# Patient Record
Sex: FEMALE | Race: WHITE | Employment: UNEMPLOYED | URBAN - METROPOLITAN AREA
[De-identification: names, ages, dates, MRNs, and addresses within clinical notes are randomized per-mention and may not be internally consistent; named-entity substitution may affect disease eponyms.]

---
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
## 2018-12-07 ENCOUNTER — HOSPITAL ENCOUNTER (EMERGENCY)
Age: 18
Discharge: HOME | End: 2018-12-07
Payer: MEDICAID

## 2018-12-07 VITALS
HEIGHT: 64 IN | WEIGHT: 197 LBS | DIASTOLIC BLOOD PRESSURE: 70 MMHG | OXYGEN SATURATION: 99 % | TEMPERATURE: 98.8 F | HEART RATE: 96 BPM | BODY MASS INDEX: 33.63 KG/M2 | RESPIRATION RATE: 16 BRPM | SYSTOLIC BLOOD PRESSURE: 132 MMHG

## 2018-12-07 DIAGNOSIS — L03.011 PARONYCHIA OF FINGER OF RIGHT HAND: Primary | ICD-10-CM

## 2018-12-07 PROCEDURE — 10060 I&D ABSCESS SIMPLE/SINGLE: CPT

## 2018-12-07 PROCEDURE — 6370000000 HC RX 637 (ALT 250 FOR IP): Performed by: NURSE PRACTITIONER

## 2018-12-07 PROCEDURE — 99283 EMERGENCY DEPT VISIT LOW MDM: CPT

## 2018-12-07 RX ORDER — SULFAMETHOXAZOLE AND TRIMETHOPRIM 800; 160 MG/1; MG/1
1 TABLET ORAL 2 TIMES DAILY
Qty: 20 TABLET | Refills: 0 | Status: SHIPPED | OUTPATIENT
Start: 2018-12-07 | End: 2018-12-17

## 2018-12-07 RX ORDER — OXYCODONE HYDROCHLORIDE AND ACETAMINOPHEN 5; 325 MG/1; MG/1
1 TABLET ORAL EVERY 6 HOURS PRN
Qty: 10 TABLET | Refills: 0 | Status: SHIPPED | OUTPATIENT
Start: 2018-12-07 | End: 2018-12-10

## 2018-12-07 RX ORDER — OXYCODONE HYDROCHLORIDE AND ACETAMINOPHEN 5; 325 MG/1; MG/1
1 TABLET ORAL ONCE
Status: COMPLETED | OUTPATIENT
Start: 2018-12-07 | End: 2018-12-07

## 2018-12-07 RX ADMIN — OXYCODONE AND ACETAMINOPHEN 1 TABLET: 5; 325 TABLET ORAL at 14:45

## 2018-12-07 ASSESSMENT — PAIN DESCRIPTION - LOCATION: LOCATION: HAND

## 2018-12-07 ASSESSMENT — PAIN SCALES - GENERAL
PAINLEVEL_OUTOF10: 10
PAINLEVEL_OUTOF10: 9

## 2018-12-07 ASSESSMENT — PAIN DESCRIPTION - PAIN TYPE: TYPE: ACUTE PAIN

## 2018-12-07 ASSESSMENT — PAIN DESCRIPTION - ORIENTATION: ORIENTATION: RIGHT

## 2018-12-07 ASSESSMENT — PAIN DESCRIPTION - DESCRIPTORS: DESCRIPTORS: BURNING;DISCOMFORT;THROBBING

## 2019-03-23 ENCOUNTER — HOSPITAL ENCOUNTER (EMERGENCY)
Age: 19
Discharge: HOME | End: 2019-03-23
Attending: EMERGENCY MEDICINE
Payer: MEDICAID

## 2019-03-23 ENCOUNTER — APPOINTMENT (OUTPATIENT)
Dept: GENERAL RADIOLOGY | Age: 19
End: 2019-03-23
Payer: MEDICAID

## 2019-03-23 VITALS
TEMPERATURE: 98 F | RESPIRATION RATE: 18 BRPM | HEART RATE: 95 BPM | DIASTOLIC BLOOD PRESSURE: 75 MMHG | SYSTOLIC BLOOD PRESSURE: 118 MMHG | OXYGEN SATURATION: 98 %

## 2019-03-23 DIAGNOSIS — F19.10 POLYSUBSTANCE ABUSE (HCC): Primary | ICD-10-CM

## 2019-03-23 PROCEDURE — 99284 EMERGENCY DEPT VISIT MOD MDM: CPT

## 2019-03-23 PROCEDURE — 71046 X-RAY EXAM CHEST 2 VIEWS: CPT

## 2019-03-23 PROCEDURE — 6370000000 HC RX 637 (ALT 250 FOR IP): Performed by: EMERGENCY MEDICINE

## 2019-03-23 RX ORDER — IBUPROFEN 400 MG/1
600 TABLET ORAL ONCE
Status: COMPLETED | OUTPATIENT
Start: 2019-03-23 | End: 2019-03-23

## 2019-03-23 RX ADMIN — IBUPROFEN 600 MG: 400 TABLET ORAL at 12:18

## 2019-03-23 ASSESSMENT — PAIN SCALES - GENERAL: PAINLEVEL_OUTOF10: 6

## 2019-06-27 ENCOUNTER — HOSPITAL ENCOUNTER (OUTPATIENT)
Age: 19
Discharge: HOME | End: 2019-06-29
Payer: COMMERCIAL

## 2019-06-27 PROCEDURE — 87491 CHLMYD TRACH DNA AMP PROBE: CPT

## 2019-06-27 PROCEDURE — 87591 N.GONORRHOEAE DNA AMP PROB: CPT

## 2019-07-01 LAB
C. TRACHOMATIS DNA ,URINE: NEGATIVE
N. GONORRHOEAE DNA, URINE: NEGATIVE
SOURCE: NORMAL

## 2020-04-10 ENCOUNTER — HOSPITAL ENCOUNTER (EMERGENCY)
Age: 20
Discharge: HOME | End: 2020-04-10
Payer: COMMERCIAL

## 2020-04-10 VITALS
BODY MASS INDEX: 36.7 KG/M2 | OXYGEN SATURATION: 99 % | WEIGHT: 215 LBS | SYSTOLIC BLOOD PRESSURE: 135 MMHG | HEIGHT: 64 IN | DIASTOLIC BLOOD PRESSURE: 70 MMHG | RESPIRATION RATE: 16 BRPM | TEMPERATURE: 98.1 F | HEART RATE: 86 BPM

## 2020-04-10 PROCEDURE — 99283 EMERGENCY DEPT VISIT LOW MDM: CPT

## 2020-04-10 RX ORDER — BUPROPION HYDROCHLORIDE 300 MG/1
300 TABLET ORAL EVERY MORNING
Status: ON HOLD | COMMUNITY
End: 2021-09-27 | Stop reason: ALTCHOICE

## 2020-04-10 RX ORDER — CLONIDINE HYDROCHLORIDE 0.1 MG/1
0.1 TABLET ORAL 2 TIMES DAILY
Status: ON HOLD | COMMUNITY
End: 2021-09-27 | Stop reason: ALTCHOICE

## 2020-04-10 RX ORDER — BUPROPION HYDROCHLORIDE 150 MG/1
150 TABLET, EXTENDED RELEASE ORAL 2 TIMES DAILY
Status: ON HOLD | COMMUNITY
End: 2021-09-27 | Stop reason: ALTCHOICE

## 2020-04-10 RX ORDER — OXCARBAZEPINE 300 MG/1
300 TABLET, FILM COATED ORAL 2 TIMES DAILY
Status: ON HOLD | COMMUNITY
End: 2021-09-27 | Stop reason: ALTCHOICE

## 2020-04-10 ASSESSMENT — PAIN DESCRIPTION - ORIENTATION: ORIENTATION: LEFT;LOWER

## 2020-04-10 ASSESSMENT — PAIN DESCRIPTION - LOCATION: LOCATION: MOUTH

## 2020-04-11 NOTE — ED PROVIDER NOTES
found for this visit on 04/10/20. RADIOLOGY:  Interpreted by Radiologist.  No orders to display       ------------------------- NURSING NOTES AND VITALS REVIEWED ---------------------------   The nursing notes within the ED encounter and vital signs as below have been reviewed. /70   Pulse 86   Temp 98.1 °F (36.7 °C)   Resp 16   Ht 5' 4\" (1.626 m)   Wt 215 lb (97.5 kg)   SpO2 99%   BMI 36.90 kg/m²   Oxygen Saturation Interpretation: Normal      ---------------------------------------------------PHYSICAL EXAM--------------------------------------      Constitutional/General: Alert and oriented x3, well appearing, non toxic in NAD  Head: Normocephalic and atraumatic  Eyes: PERRL, EOMI  Mouth: Oropharynx clear, handling secretions, no trismus, piercing to left lower lip present with mucosal skin growing over top of the piercing. Upon removal only minimal bleeding and puncture to the left lower lip. Neck: Supple, full ROM,   Pulmonary: Lungs clear to auscultation bilaterally, no wheezes, rales, or rhonchi. Not in respiratory distress  Cardiovascular:  Regular rate and rhythm, no murmurs, gallops, or rubs. 2+ distal pulses  Abdomen: Soft, non tender, non distended,   Extremities: Moves all extremities x 4. Warm and well perfused  Skin: warm and dry without rash  Neurologic: GCS 15,  Psych: Normal Affect      ------------------------------ ED COURSE/MEDICAL DECISION MAKING----------------------  Medications - No data to display      ED COURSE:       Medical Decision Making:    Patient is a 64-year-old female presenting emergency department is unable to remove a lip ring. I was initially unable to retrieve the inner portion of the ring therefore her lip was numbed with 1% lidocaine without epinephrine. Gentle force was applied to retrieve the inner portion of the ring and hemostats were used to clamp the ring and spin to remove it. Patient tolerated the procedure well with no complications.

## 2020-09-03 ENCOUNTER — TELEPHONE (OUTPATIENT)
Dept: OBGYN | Age: 20
End: 2020-09-03

## 2020-09-03 NOTE — TELEPHONE ENCOUNTER
Pt phoned in with c/o white discharge, itching and burning. Believes she has a yeast infection. States she is approx 7 weeks pregnant and has initial prenatal visit scheduled on 9/17. Per you, I notified pt to take OTC Monistat and advised her to call if still having symptoms after treatment. She verbalized understanding.

## 2020-09-14 ENCOUNTER — APPOINTMENT (OUTPATIENT)
Dept: ULTRASOUND IMAGING | Age: 20
End: 2020-09-14
Payer: COMMERCIAL

## 2020-09-14 ENCOUNTER — HOSPITAL ENCOUNTER (EMERGENCY)
Age: 20
Discharge: HOME | End: 2020-09-15
Attending: EMERGENCY MEDICINE
Payer: COMMERCIAL

## 2020-09-14 LAB
ABO/RH: NORMAL
ALBUMIN SERPL-MCNC: 4 G/DL (ref 3.5–5.2)
ALP BLD-CCNC: 59 U/L (ref 35–104)
ALT SERPL-CCNC: 14 U/L (ref 0–32)
ANION GAP SERPL CALCULATED.3IONS-SCNC: 10 MMOL/L (ref 7–16)
AST SERPL-CCNC: 18 U/L (ref 0–31)
BACTERIA: ABNORMAL /HPF
BASOPHILS ABSOLUTE: 0.05 E9/L (ref 0–0.2)
BASOPHILS RELATIVE PERCENT: 0.6 % (ref 0–2)
BILIRUB SERPL-MCNC: 0.5 MG/DL (ref 0–1.2)
BILIRUBIN URINE: NEGATIVE
BLOOD, URINE: ABNORMAL
BUN BLDV-MCNC: 20 MG/DL (ref 6–20)
CALCIUM SERPL-MCNC: 8.9 MG/DL (ref 8.6–10.2)
CHLORIDE BLD-SCNC: 103 MMOL/L (ref 98–107)
CLARITY: ABNORMAL
CO2: 25 MMOL/L (ref 22–29)
COLOR: ABNORMAL
CREAT SERPL-MCNC: 0.6 MG/DL (ref 0.5–1)
EOSINOPHILS ABSOLUTE: 0.2 E9/L (ref 0.05–0.5)
EOSINOPHILS RELATIVE PERCENT: 2.3 % (ref 0–6)
EPITHELIAL CELLS, UA: ABNORMAL /HPF
GFR AFRICAN AMERICAN: >60
GFR NON-AFRICAN AMERICAN: >60 ML/MIN/1.73
GLUCOSE BLD-MCNC: 96 MG/DL (ref 74–99)
GLUCOSE URINE: NEGATIVE MG/DL
GONADOTROPIN, CHORIONIC (HCG) QUANT: ABNORMAL MIU/ML
HCG, URINE, POC: POSITIVE
HCT VFR BLD CALC: 37.6 % (ref 34–48)
HEMOGLOBIN: 12.6 G/DL (ref 11.5–15.5)
IMMATURE GRANULOCYTES #: 0.01 E9/L
IMMATURE GRANULOCYTES %: 0.1 % (ref 0–5)
KETONES, URINE: NEGATIVE MG/DL
LEUKOCYTE ESTERASE, URINE: ABNORMAL
LYMPHOCYTES ABSOLUTE: 2.95 E9/L (ref 1.5–4)
LYMPHOCYTES RELATIVE PERCENT: 34.2 % (ref 20–42)
Lab: NORMAL
MCH RBC QN AUTO: 31 PG (ref 26–35)
MCHC RBC AUTO-ENTMCNC: 33.5 % (ref 32–34.5)
MCV RBC AUTO: 92.6 FL (ref 80–99.9)
MONOCYTES ABSOLUTE: 0.59 E9/L (ref 0.1–0.95)
MONOCYTES RELATIVE PERCENT: 6.8 % (ref 2–12)
NEGATIVE QC PASS/FAIL: NORMAL
NEUTROPHILS ABSOLUTE: 4.82 E9/L (ref 1.8–7.3)
NEUTROPHILS RELATIVE PERCENT: 56 % (ref 43–80)
NITRITE, URINE: NEGATIVE
PDW BLD-RTO: 11.8 FL (ref 11.5–15)
PH UA: 7 (ref 5–9)
PLATELET # BLD: 230 E9/L (ref 130–450)
PMV BLD AUTO: 10.1 FL (ref 7–12)
POSITIVE QC PASS/FAIL: NORMAL
POTASSIUM SERPL-SCNC: 3.9 MMOL/L (ref 3.5–5)
PROTEIN UA: 100 MG/DL
RBC # BLD: 4.06 E12/L (ref 3.5–5.5)
RBC UA: ABNORMAL /HPF (ref 0–2)
SODIUM BLD-SCNC: 138 MMOL/L (ref 132–146)
SPECIFIC GRAVITY UA: 1.02 (ref 1–1.03)
TOTAL PROTEIN: 6.9 G/DL (ref 6.4–8.3)
UROBILINOGEN, URINE: 2 E.U./DL
WBC # BLD: 8.6 E9/L (ref 4.5–11.5)
WBC UA: ABNORMAL /HPF (ref 0–5)

## 2020-09-14 PROCEDURE — 99284 EMERGENCY DEPT VISIT MOD MDM: CPT

## 2020-09-14 PROCEDURE — 84702 CHORIONIC GONADOTROPIN TEST: CPT

## 2020-09-14 PROCEDURE — 80053 COMPREHEN METABOLIC PANEL: CPT

## 2020-09-14 PROCEDURE — 86900 BLOOD TYPING SEROLOGIC ABO: CPT

## 2020-09-14 PROCEDURE — 85025 COMPLETE CBC W/AUTO DIFF WBC: CPT

## 2020-09-14 PROCEDURE — 76817 TRANSVAGINAL US OBSTETRIC: CPT

## 2020-09-14 PROCEDURE — 81001 URINALYSIS AUTO W/SCOPE: CPT

## 2020-09-14 PROCEDURE — 86901 BLOOD TYPING SEROLOGIC RH(D): CPT

## 2020-09-14 ASSESSMENT — ENCOUNTER SYMPTOMS
NAUSEA: 0
EYE REDNESS: 0
COUGH: 0
WHEEZING: 0
EYE DISCHARGE: 0
DIARRHEA: 0
SINUS PRESSURE: 0
SHORTNESS OF BREATH: 0
BACK PAIN: 0
ABDOMINAL DISTENTION: 0
VOMITING: 0
EYE PAIN: 0
ABDOMINAL PAIN: 1
SORE THROAT: 0

## 2020-09-14 ASSESSMENT — PAIN DESCRIPTION - LOCATION: LOCATION: ABDOMEN

## 2020-09-14 ASSESSMENT — PAIN SCALES - GENERAL: PAINLEVEL_OUTOF10: 7

## 2020-09-15 VITALS
TEMPERATURE: 98.5 F | RESPIRATION RATE: 18 BRPM | OXYGEN SATURATION: 100 % | SYSTOLIC BLOOD PRESSURE: 145 MMHG | DIASTOLIC BLOOD PRESSURE: 119 MMHG | HEART RATE: 86 BPM

## 2020-09-15 NOTE — ED PROVIDER NOTES
Patient is a 24 y/o female who presents to the ED with abdominal cramping and vaginal bleeding. Patient states that she is currently approximately 9 weeks pregnant. Tonight, she had onset of vaginal bleeding and lower abdominal cramping. She reports filling one sanitary napkin with blood. She denies passage of clots. She also has had diarrhea today. She denies any fever, nausea or vomiting. Review of Systems   Constitutional: Negative for chills and fever. HENT: Negative for ear pain, sinus pressure and sore throat. Eyes: Negative for pain, discharge and redness. Respiratory: Negative for cough, shortness of breath and wheezing. Cardiovascular: Negative for chest pain. Gastrointestinal: Positive for abdominal pain. Negative for abdominal distention, diarrhea, nausea and vomiting. Genitourinary: Positive for vaginal bleeding. Negative for dysuria and frequency. Musculoskeletal: Negative for arthralgias and back pain. Skin: Negative for rash and wound. Neurological: Negative for weakness and headaches. Hematological: Negative for adenopathy. All other systems reviewed and are negative. Physical Exam  Vitals signs and nursing note reviewed. Constitutional:       Appearance: She is obese. HENT:      Head: Normocephalic and atraumatic. Right Ear: External ear normal.      Left Ear: External ear normal.      Nose: Nose normal.      Mouth/Throat:      Mouth: Mucous membranes are moist.   Eyes:      Conjunctiva/sclera: Conjunctivae normal.      Pupils: Pupils are equal, round, and reactive to light. Neck:      Musculoskeletal: Normal range of motion and neck supple. Cardiovascular:      Rate and Rhythm: Normal rate and regular rhythm. Heart sounds: No murmur. Pulmonary:      Effort: Pulmonary effort is normal. No respiratory distress. Breath sounds: Normal breath sounds. No stridor. No wheezing, rhonchi or rales.    Abdominal:      General: Bowel sounds are normal. There is no distension. Palpations: Abdomen is soft. Tenderness: There is no abdominal tenderness. There is no guarding. Musculoskeletal: Normal range of motion. General: No swelling. Skin:     General: Skin is warm and dry. Findings: No rash. Neurological:      Mental Status: She is alert and oriented to person, place, and time. Procedures     [facility]              --------------------------------------------- PAST HISTORY ---------------------------------------------  Past Medical History:  has a past medical history of Acid reflux disease, Anemia, Anxiety, Depression, Ingrowing toenail of right foot, and Thyroid disease. Past Surgical History:  has a past surgical history that includes [place] tooth extraction (11/18/15) and nasal/sinus endoscopy. Social History:  reports that she has quit smoking. Her smoking use included cigarettes. She smoked 0.50 packs per day. She has never used smokeless tobacco. She reports previous drug use. Drugs: Methamphetamines, Marijuana, Opiates , and IV. She reports that she does not drink alcohol. Family History: family history is not on file. The patients home medications have been reviewed. Allergies: Effexor [venlafaxine hcl];  Paxil [paroxetine hcl]; and Powder    -------------------------------------------------- RESULTS -------------------------------------------------  Labs:  Results for orders placed or performed during the hospital encounter of 09/14/20   Urinalysis   Result Value Ref Range    Color, UA USMAN (A) Straw/Yellow    Clarity, UA CLOUDY (A) Clear    Glucose, Ur Negative Negative mg/dL    Bilirubin Urine Negative Negative    Ketones, Urine Negative Negative mg/dL    Specific Gravity, UA 1.025 1.005 - 1.030    Blood, Urine LARGE (A) Negative    pH, UA 7.0 5.0 - 9.0    Protein,  (A) Negative mg/dL    Urobilinogen, Urine 2.0 (A) <2.0 E.U./dL    Nitrite, Urine Negative Negative    Leukocyte Esterase, Urine TRACE (A) Negative   CBC auto differential   Result Value Ref Range    WBC 8.6 4.5 - 11.5 E9/L    RBC 4.06 3.50 - 5.50 E12/L    Hemoglobin 12.6 11.5 - 15.5 g/dL    Hematocrit 37.6 34.0 - 48.0 %    MCV 92.6 80.0 - 99.9 fL    MCH 31.0 26.0 - 35.0 pg    MCHC 33.5 32.0 - 34.5 %    RDW 11.8 11.5 - 15.0 fL    Platelets 661 440 - 180 E9/L    MPV 10.1 7.0 - 12.0 fL    Neutrophils % 56.0 43.0 - 80.0 %    Immature Granulocytes % 0.1 0.0 - 5.0 %    Lymphocytes % 34.2 20.0 - 42.0 %    Monocytes % 6.8 2.0 - 12.0 %    Eosinophils % 2.3 0.0 - 6.0 %    Basophils % 0.6 0.0 - 2.0 %    Neutrophils Absolute 4.82 1.80 - 7.30 E9/L    Immature Granulocytes # 0.01 E9/L    Lymphocytes Absolute 2.95 1.50 - 4.00 E9/L    Monocytes Absolute 0.59 0.10 - 0.95 E9/L    Eosinophils Absolute 0.20 0.05 - 0.50 E9/L    Basophils Absolute 0.05 0.00 - 0.20 E9/L   Comprehensive Metabolic Panel   Result Value Ref Range    Sodium 138 132 - 146 mmol/L    Potassium 3.9 3.5 - 5.0 mmol/L    Chloride 103 98 - 107 mmol/L    CO2 25 22 - 29 mmol/L    Anion Gap 10 7 - 16 mmol/L    Glucose 96 74 - 99 mg/dL    BUN 20 6 - 20 mg/dL    CREATININE 0.6 0.5 - 1.0 mg/dL    GFR Non-African American >60 >=60 mL/min/1.73    GFR African American >60     Calcium 8.9 8.6 - 10.2 mg/dL    Total Protein 6.9 6.4 - 8.3 g/dL    Alb 4.0 3.5 - 5.2 g/dL    Total Bilirubin 0.5 0.0 - 1.2 mg/dL    Alkaline Phosphatase 59 35 - 104 U/L    ALT 14 0 - 32 U/L    AST 18 0 - 31 U/L   hCG, quantitative, pregnancy   Result Value Ref Range    hCG Quant 98891.0 (H) <10 mIU/mL   Microscopic Urinalysis   Result Value Ref Range    WBC, UA 0-1 0 - 5 /HPF    RBC, UA PACKED 0 - 2 /HPF    Epithelial Cells, UA RARE /HPF    Bacteria, UA FEW (A) None Seen /HPF   POC Pregnancy Urine Qual   Result Value Ref Range    HCG, Urine, POC Positive Negative    Lot Number [id number]     Positive QC Pass/Fail Pass     Negative QC Pass/Fail Pass    ABO/RH   Result Value Ref Range    ABO/Rh AB POS        Radiology:  US OB TRANSVAGINAL   Preliminary Result   Findings compatible with intrauterine fetal demise. 5.0 cm left ovarian cyst.  This does not require imaging follow up. Normal blood flow to the ovaries. *Ivan MORENO, Cinda Agee, Nicanor Roberts, et al. Diagnostic Criteria for Nonviable   Pregnancy Early in the First Trimester. [facility]. [id number]      Critical results were called by Dr. Lexis Reno to [facility] on   9/14/2020 at 22:33.             ------------------------- NURSING NOTES AND VITALS REVIEWED ---------------------------  Date / Time Roomed:  9/14/2020  9:13 PM  ED Bed Assignment:  16/16    The nursing notes within the ED encounter and vital signs as below have been reviewed. /65   Pulse 65   Temp 99 °F (37.2 °C) (Oral)   Resp 18   LMP 07/07/2020   SpO2 98%   Oxygen Saturation Interpretation: Normal      ------------------------------------------ PROGRESS NOTES ------------------------------------------  I have spoken with the patient and discussed todays results, in addition to providing specific details for the plan of care and counseling regarding the diagnosis and prognosis. Their questions are answered at this time and they are agreeable with the plan. I discussed at length with them reasons for immediate return here for re evaluation. They will followup with primary care by calling their office tomorrow. --------------------------------- ADDITIONAL PROVIDER NOTES ---------------------------------  At this time the patient is without objective evidence of an acute process requiring hospitalization or inpatient management. They have remained hemodynamically stable throughout their entire ED visit and are stable for discharge with outpatient follow-up. The plan has been discussed in detail and they are aware of the specific conditions for emergent return, as well as the importance of follow-up. New Prescriptions    No medications on file       Diagnosis:  1. Inevitable spontaneous         Disposition:  Patient's disposition: Discharge to home  Patient's condition is stable.            Christiano Sandra DO  09/15/20 0001

## 2020-09-17 ENCOUNTER — HOSPITAL ENCOUNTER (OUTPATIENT)
Age: 20
Discharge: HOME | End: 2020-09-17
Payer: COMMERCIAL

## 2020-09-17 ENCOUNTER — OFFICE VISIT (OUTPATIENT)
Dept: OBGYN | Age: 20
End: 2020-09-17
Payer: COMMERCIAL

## 2020-09-17 VITALS
BODY MASS INDEX: 39.82 KG/M2 | WEIGHT: 232 LBS | SYSTOLIC BLOOD PRESSURE: 137 MMHG | HEART RATE: 73 BPM | DIASTOLIC BLOOD PRESSURE: 75 MMHG

## 2020-09-17 PROBLEM — Z3A.37 37 WEEKS GESTATION OF PREGNANCY: Status: RESOLVED | Noted: 2017-04-11 | Resolved: 2020-09-17

## 2020-09-17 PROBLEM — O99.213 MATERNAL MORBID OBESITY IN THIRD TRIMESTER, ANTEPARTUM (HCC): Status: RESOLVED | Noted: 2017-03-02 | Resolved: 2020-09-17

## 2020-09-17 PROBLEM — O36.5990 POOR FETAL GROWTH, AFFECTING MANAGEMENT OF MOTHER, ANTEPARTUM CONDITION OR COMPLICATION: Status: RESOLVED | Noted: 2017-03-02 | Resolved: 2020-09-17

## 2020-09-17 PROBLEM — E66.01 MATERNAL MORBID OBESITY IN THIRD TRIMESTER, ANTEPARTUM (HCC): Status: RESOLVED | Noted: 2017-03-02 | Resolved: 2020-09-17

## 2020-09-17 PROBLEM — O99.323 DRUG USE COMPLICATING PREGNANCY IN THIRD TRIMESTER: Status: RESOLVED | Noted: 2017-03-02 | Resolved: 2020-09-17

## 2020-09-17 PROBLEM — O99.343 MENTAL DISORDER COMPLICATING PREGNANCY IN THIRD TRIMESTER: Status: RESOLVED | Noted: 2017-03-02 | Resolved: 2020-09-17

## 2020-09-17 LAB
CONTROL: POSITIVE
GONADOTROPIN, CHORIONIC (HCG) QUANT: 1762 MIU/ML
PREGNANCY TEST URINE, POC: POSITIVE

## 2020-09-17 PROCEDURE — 84702 CHORIONIC GONADOTROPIN TEST: CPT

## 2020-09-17 PROCEDURE — 99202 OFFICE O/P NEW SF 15 MIN: CPT | Performed by: OBSTETRICS & GYNECOLOGY

## 2020-09-17 PROCEDURE — 36415 COLL VENOUS BLD VENIPUNCTURE: CPT

## 2020-09-17 PROCEDURE — 36415 COLL VENOUS BLD VENIPUNCTURE: CPT | Performed by: OBSTETRICS & GYNECOLOGY

## 2020-09-17 PROCEDURE — 81025 URINE PREGNANCY TEST: CPT | Performed by: OBSTETRICS & GYNECOLOGY

## 2020-09-17 PROCEDURE — 99203 OFFICE O/P NEW LOW 30 MIN: CPT | Performed by: OBSTETRICS & GYNECOLOGY

## 2020-09-17 NOTE — PROGRESS NOTES
Deric Merrimack     Patient presents for ED follow up. Patient was seen in the ED 20 with vaginal bleeding, stating she was approximately 9 weeks pregnant. HCG 15,000. Pelvic ultrasound showed an irregular gestational sac, fetus without cardiac activity. Patient was diagnosed with missed . Since that time patient has had significant bleeding with passage of tissue. Her bleeding has started to slow today. Patient has minimal cramps. Patient is Rh positive, does not need rhogam.     Past Medical History:   Diagnosis Date    Acid reflux disease     Anemia     hx of anemia    Anxiety     Depression     Hepatitis C     Ingrowing toenail of right foot     Thyroid disease     slightly abnormal labs, no medication         Past Surgical History:   Procedure Laterality Date     SECTION      NASAL/SINUS ENDOSCOPY      WISDOM TOOTH EXTRACTION  11/18/15    X4 WISDOM TEETH EXTRACTED        Family History   Adopted: Yes          Current Outpatient Medications:     OXcarbazepine (TRILEPTAL) 300 MG tablet, Take 300 mg by mouth 2 times daily, Disp: , Rfl:     cloNIDine (CATAPRES) 0.1 MG tablet, Take 0.1 mg by mouth 2 times daily, Disp: , Rfl:     buPROPion (WELLBUTRIN XL) 300 MG extended release tablet, Take 300 mg by mouth every morning, Disp: , Rfl:     buPROPion (WELLBUTRIN SR) 150 MG extended release tablet, Take 150 mg by mouth 2 times daily, Disp: , Rfl:     ibuprofen (ADVIL;MOTRIN) 800 MG tablet, Take 1 tablet by mouth every 8 hours (Patient not taking: Reported on 2020), Disp: 46 tablet, Rfl: 1     Allergies   Allergen Reactions    Effexor [Venlafaxine Hcl] Shortness Of Breath    Paxil [Paroxetine Hcl] Shortness Of Breath    Powder      Powder in the non-latex gloves.  Is NOT allergic to latex        Social History     Tobacco History     Smoking Status  Former Smoker Smoking Frequency  0.5 packs/day Smoking Tobacco Type  Cigarettes    Smokeless Tobacco Use  Never Used    Tobacco Comment  stopped with pregnancy          Alcohol History     Alcohol Use Status  No          Drug Use     Drug Use Status  Not Currently Types  IV, Marijuana, Methamphetamines, Opiates  Comment  before knew pregnant          Sexual Activity     Sexually Active  Yes Partners  Male                 Vitals:    09/17/20 1400   BP: 137/75   Pulse: 73        Physical Exam:  General: pleasant, alert     Pelvic exam: normal external genitalia, vulva, vagina, cervix, uterus and adnexa. Small amount of tissue present at external os, removed with ringed forceps. Izabela Castillo was seen today for follow-up. Diagnoses and all orders for this visit:    Miscarriage  -     POCT urine pregnancy  -     HCG, Quantitative, Pregnancy; Future  -     HCG, Quantitative, Pregnancy;  Future      Will trend HCG levels to normal. Will need to return for annual.     Return for Annual.     Birtha Ganser, MD

## 2020-09-17 NOTE — PROGRESS NOTES
Pt here today for ED follow up. She is tearful. Was seen on  with abdominal cramping and vaginal bleeding and diagnosed with a spontaneous . She states she is still having bleeding and passing clots. Urine pregnancy test done with positive results. Pelvic exam done by Dr. Racquel Otto. Blood work drawn, labeled and sent to lab. Discharge instructions have been discussed with the patient by Dr. Racquel Otto and pt voiced understanding of care plan.

## 2020-09-21 ENCOUNTER — TELEPHONE (OUTPATIENT)
Dept: OBGYN | Age: 20
End: 2020-09-21

## 2020-09-21 ENCOUNTER — HOSPITAL ENCOUNTER (OUTPATIENT)
Dept: ULTRASOUND IMAGING | Age: 20
Discharge: HOME | End: 2020-09-23
Payer: COMMERCIAL

## 2020-09-21 PROCEDURE — 76830 TRANSVAGINAL US NON-OB: CPT

## 2020-09-21 PROCEDURE — 76856 US EXAM PELVIC COMPLETE: CPT

## 2020-09-21 NOTE — TELEPHONE ENCOUNTER
Telephone Call     Reviewed ultrasound report with patient. Gestational sac that was seen on prior ultrasound no longer present. Discussed that  Patient has passed the pregnancy. Advised to call with any questions or concerns.      Birtha Ganser, MD

## 2021-09-27 ENCOUNTER — HOSPITAL ENCOUNTER (OUTPATIENT)
Age: 21
Discharge: HOME | End: 2021-09-27
Attending: OBSTETRICS & GYNECOLOGY | Admitting: OBSTETRICS & GYNECOLOGY
Payer: COMMERCIAL

## 2021-09-27 VITALS
DIASTOLIC BLOOD PRESSURE: 57 MMHG | RESPIRATION RATE: 16 BRPM | SYSTOLIC BLOOD PRESSURE: 106 MMHG | TEMPERATURE: 98.8 F | OXYGEN SATURATION: 97 % | HEART RATE: 83 BPM

## 2021-09-27 PROBLEM — Z3A.36 36 WEEKS GESTATION OF PREGNANCY: Status: ACTIVE | Noted: 2021-09-27

## 2021-09-27 LAB
AMORPHOUS: ABNORMAL
BACTERIA: ABNORMAL /HPF
BILIRUBIN URINE: NEGATIVE
BLOOD, URINE: NEGATIVE
CLARITY: ABNORMAL
COLOR: YELLOW
EPITHELIAL CELLS, UA: ABNORMAL /HPF
GLUCOSE URINE: NEGATIVE MG/DL
KETONES, URINE: NEGATIVE MG/DL
LEUKOCYTE ESTERASE, URINE: NEGATIVE
MUCUS: PRESENT /LPF
NITRITE, URINE: NEGATIVE
PH UA: 8.5 (ref 5–9)
PROTEIN UA: 30 MG/DL
RBC UA: ABNORMAL /HPF (ref 0–2)
SPECIFIC GRAVITY UA: 1.02 (ref 1–1.03)
UROBILINOGEN, URINE: 2 E.U./DL
WBC UA: ABNORMAL /HPF (ref 0–5)

## 2021-09-27 PROCEDURE — 6370000000 HC RX 637 (ALT 250 FOR IP): Performed by: OBSTETRICS & GYNECOLOGY

## 2021-09-27 PROCEDURE — 81001 URINALYSIS AUTO W/SCOPE: CPT

## 2021-09-27 PROCEDURE — 99211 OFF/OP EST MAY X REQ PHY/QHP: CPT

## 2021-09-27 RX ORDER — PANTOPRAZOLE SODIUM 40 MG/1
40 TABLET, DELAYED RELEASE ORAL
Status: DISCONTINUED | OUTPATIENT
Start: 2021-09-27 | End: 2021-09-27 | Stop reason: HOSPADM

## 2021-09-27 RX ORDER — PANTOPRAZOLE SODIUM 40 MG/1
40 TABLET, DELAYED RELEASE ORAL
Status: DISCONTINUED | OUTPATIENT
Start: 2021-09-28 | End: 2021-09-27

## 2021-09-27 RX ORDER — FERROUS SULFATE 325(65) MG
325 TABLET ORAL
COMMUNITY

## 2021-09-27 RX ORDER — PANTOPRAZOLE SODIUM 40 MG/1
40 TABLET, DELAYED RELEASE ORAL
Status: COMPLETED | OUTPATIENT
Start: 2021-09-27 | End: 2021-09-27

## 2021-09-27 RX ADMIN — PANTOPRAZOLE SODIUM 40 MG: 40 TABLET, DELAYED RELEASE ORAL at 19:12

## 2021-09-27 RX ADMIN — PANTOPRAZOLE SODIUM 40 MG: 40 TABLET, DELAYED RELEASE ORAL at 17:50

## 2021-09-27 NOTE — H&P
Department of Obstetrics and Gynecology   Obstetrics History and Physical      Ric Benjamin  2021  [id number]    CHIEF COMPLAINT:  ***    HISTORY OF PRESENT ILLNESS:      The patient is a 19 y.o. female  at 36w1d.   OB History        3    Para   1    Term   1            AB   1    Living   1       SAB   1    TAB        Ectopic        Molar        Multiple        Live Births   1            Patient presents with a chief complaint as above and is being admitted for ***    Estimated Due Date: Estimated Date of Delivery: 10/24/21    PRENATAL CARE:    Complicated by:   Patient Active Problem List   Diagnosis Code    Impacted third molar tooth K01.1    Obesity, Class III, BMI 40-49.9 (morbid obesity) ([facility]) E66.01    Cannabis dependence ([facility] [address] F12.20    Abdominal pain affecting pregnancy O26.899, R10.9       PAST OB HISTORY  OB History        3    Para   1    Term   1            AB   1    Living   1       SAB   1    TAB        Ectopic        Molar        Multiple        Live Births   1                Past Medical History:        Diagnosis Date    Acid reflux disease     Anemia     hx of anemia    Anxiety     Depression     anxiety, bipolar    Hepatitis C     Ingrowing toenail of right foot     Thyroid disease     slightly abnormal labs, no medication      Past Surgical History:        Procedure Laterality Date    ABDOMEN SURGERY  2017     section     SECTION      NASAL/SINUS ENDOSCOPY      WISDOM TOOTH EXTRACTION  11/18/15    X4 WISDOM TEETH EXTRACTED     Allergies:  Effexor [venlafaxine hcl], Paxil [paroxetine hcl], and Powder  Social History:    Social History     Socioeconomic History    Marital status: Single     Spouse name: Not on file    Number of children: Not on file    Years of education: Not on file    Highest education level: Not on file   Occupational History    Not on file   Tobacco Use    Smoking status: Former Smoker     Packs/day: 0.50     Types: Cigarettes     Quit date: 2021     Years since quittin.6    Smokeless tobacco: Never Used    Tobacco comment: stopped with pregnancy   Vaping Use    Vaping Use: Former    Substances: Nicotine, CBD    Devices: Pre-filled pod   Substance and Sexual Activity    Alcohol use: No    Drug use: Not Currently     Types: Methamphetamines, Marijuana, Opiates , IV     Comment: before knew pregnant    Sexual activity: Yes     Partners: Male   Other Topics Concern    Not on file   Social History Narrative    Not on file     Social Determinants of Health     Financial Resource Strain:     Difficulty of Paying Living Expenses:    Food Insecurity:     Worried About Running Out of Food in the Last Year:     [address] in the Last Year:    Transportation Needs:     Lack of Transportation (Medical):      Lack of Transportation (Non-Medical):    Physical Activity:     Days of Exercise per Week:     Minutes of Exercise per Session:    Stress:     Feeling of Stress :    Social Connections:     Frequency of Communication with Friends and Family:     Frequency of Social Gatherings with Friends and Family:     Attends [employer] Services:     Active Member of Clubs or Organizations:     Attends Club or Organization Meetings:     Marital Status:    Intimate Partner Violence:     Fear of Current or Ex-Partner:     Emotionally Abused:     Physically Abused:     Sexually Abused:      Family History:       Adopted: Yes     Medications Prior to Admission:  Medications Prior to Admission: Prenatal MV-Min-Fe Fum-FA-DHA (PRENATAL 1 PO), Take by mouth  ferrous sulfate (IRON 325) 325 (65 Fe) MG tablet, Take 325 mg by mouth daily (with breakfast)  ibuprofen (ADVIL;MOTRIN) 800 MG tablet, Take 1 tablet by mouth every 8 hours (Patient not taking: Reported on 2020)    REVIEW OF SYSTEMS:    CONSTITUTIONAL:  negative  RESPIRATORY:  negative  CARDIOVASCULAR:  negative  GASTROINTESTINAL: Negative  GENITOURINARY: Negative  ALLERGIC/IMMUNOLOGIC:  negative  NEUROLOGICAL:  negative  BEHAVIOR/PSYCH:  negative    PHYSICAL EXAM:  Blood pressure (!) 96/50, pulse 80, temperature 98.3 °F (36.8 °C), resp. rate 16, last menstrual period 01/17/2021, unknown if currently breastfeeding. General appearance:  awake, alert, cooperative, no apparent distress, and appears stated age  Abdomen:  Gravid  uterus, consistent with her gestational age 43w3d, ***  Uterine contractions. , CVA tenderness {Blank single:19197::\" Yes\",\"No\",\"***\"}      Fetal heart rate:  Reassuring.  {Fetal Heart Rate:84892},with accels and moderate variability,*** decels,  Pelvis:  Adequate pelvis  Cervix: soft   {CERVIX DILATION 1-9CM:710752767}   50%    {CERVIX STATION:911296619}  Presentation: {Blank single:19197::\"BREECH\",\"CEPHALIC\"}  Membranes:  {pe membranes intrapartum:304863::\"intact\"}  Extremities: nontender bilaterally,edema{Blank single:19197::\"3+\",\"2+\",\"1+\"}    DATA:  Labs:  CBC:   Lab Results   Component Value Date    WBC 8.6 09/14/2020    RBC 4.06 09/14/2020    HGB 12.6 09/14/2020    HCT 37.6 09/14/2020    MCV 92.6 09/14/2020    RDW 11.8 09/14/2020     09/14/2020     CMP:    Lab Results   Component Value Date     09/14/2020    K 3.9 09/14/2020     09/14/2020    CO2 25 09/14/2020    BUN 20 09/14/2020    PROT 6.9 09/14/2020     U/A:  No components found for: [place], USPGRAV, UPH, UPROTEIN, UGLUCOSE, UKETONE, UBILI, UBLOOD, UNITRITE, UUROBIL, [place], [facility], [place], [facility], [place], [place], [place]  TSH:    Lab Results   Component Value Date    TSH 1.780 03/17/2017     RPR:  No results found for: RPR  RUBELLA: No results found for: RUBELLAIGG  HEPBSAG: No results found for: HBSAGI  HIV:  No results found for: HIV  HgBA1c:  No components found for: HGBA1C  Blood Type:  No results found for: ABOINT  RH:  No results found for: ANATITER, C3, C4, RF  Antibody Screen:  No components found for: ABSCINT  Gonorrhea: No components found for: [address]  Chlamydia:  No components found for: CCHLAM  gbs-STREP B SCREEN: No results found for: GBSAG    No orders to display       No results found for this visit on 09/27/21. ASSESSMENT AND PLAN:{[facility] past number of gestation weeks:30352} pregnancy,,GERD. Active Problems:    * No active hospital problems. *  Resolved Problems:    * No resolved hospital problems.  *      Labor: OBSERVATION, anticipate normal delivery, routine labor orders  Fetus: Reassuring  GBS: {Pos/neg/not test:989503::\"Negative\"}  IVFluids,labs,analgesics/epidural as needed        Electronically signed by Maria Victoria Puckett MD on 9/27/2021 at 2:19 PM

## 2021-09-27 NOTE — PROGRESS NOTES
EDC 10/24/21 c/o N/V off and on today, vomited dark red blood after episode during the night. Called Dr. Mendez Keto office and was instructed to come for evaluation. ABD soft, non-tender. Denies urinary s/s,bleeding or leakage of fluid. Perceives fetal movement. EFM on.

## 2021-09-27 NOTE — PROGRESS NOTES
Dr. Thalia Gamble informed patient is requesting discharge. Has not had any nausea or vomiting since arrival.  May be discharged and to FU this week.

## 2021-10-20 ENCOUNTER — ANESTHESIA EVENT (OUTPATIENT)
Dept: LABOR AND DELIVERY | Age: 21
DRG: 540 | End: 2021-10-20
Payer: COMMERCIAL

## 2021-10-21 ENCOUNTER — ANESTHESIA (OUTPATIENT)
Dept: LABOR AND DELIVERY | Age: 21
DRG: 540 | End: 2021-10-21
Payer: COMMERCIAL

## 2021-10-21 ENCOUNTER — HOSPITAL ENCOUNTER (INPATIENT)
Age: 21
LOS: 2 days | Discharge: HOME | DRG: 540 | End: 2021-10-23
Attending: OBSTETRICS & GYNECOLOGY | Admitting: OBSTETRICS & GYNECOLOGY
Payer: COMMERCIAL

## 2021-10-21 VITALS
DIASTOLIC BLOOD PRESSURE: 45 MMHG | SYSTOLIC BLOOD PRESSURE: 101 MMHG | RESPIRATION RATE: 28 BRPM | OXYGEN SATURATION: 94 %

## 2021-10-21 DIAGNOSIS — Z98.891 HISTORY OF CESAREAN SECTION: ICD-10-CM

## 2021-10-21 LAB
ABO/RH: NORMAL
ALBUMIN SERPL-MCNC: 3.2 G/DL (ref 3.5–5.2)
ALP BLD-CCNC: 133 U/L (ref 35–104)
ALT SERPL-CCNC: 8 U/L (ref 0–32)
AMPHETAMINE SCREEN, URINE: NOT DETECTED
ANION GAP SERPL CALCULATED.3IONS-SCNC: 12 MMOL/L (ref 7–16)
ANTIBODY SCREEN: NORMAL
AST SERPL-CCNC: 15 U/L (ref 0–31)
BARBITURATE SCREEN URINE: NOT DETECTED
BENZODIAZEPINE SCREEN, URINE: NOT DETECTED
BILIRUB SERPL-MCNC: 0.4 MG/DL (ref 0–1.2)
BUN BLDV-MCNC: 11 MG/DL (ref 6–20)
CALCIUM SERPL-MCNC: 8.9 MG/DL (ref 8.6–10.2)
CANNABINOID SCREEN URINE: POSITIVE
CHLORIDE BLD-SCNC: 104 MMOL/L (ref 98–107)
CO2: 18 MMOL/L (ref 22–29)
COCAINE METABOLITE SCREEN URINE: NOT DETECTED
CREAT SERPL-MCNC: 0.5 MG/DL (ref 0.5–1)
FENTANYL SCREEN, URINE: NOT DETECTED
GFR AFRICAN AMERICAN: >60
GFR NON-AFRICAN AMERICAN: >60 ML/MIN/1.73
GLUCOSE BLD-MCNC: 91 MG/DL (ref 74–99)
HCT VFR BLD CALC: 33.7 % (ref 34–48)
HEMOGLOBIN: 11.4 G/DL (ref 11.5–15.5)
Lab: ABNORMAL
MCH RBC QN AUTO: 29.5 PG (ref 26–35)
MCHC RBC AUTO-ENTMCNC: 33.8 % (ref 32–34.5)
MCV RBC AUTO: 87.1 FL (ref 80–99.9)
METHADONE SCREEN, URINE: NOT DETECTED
OPIATE SCREEN URINE: NOT DETECTED
OXYCODONE URINE: NOT DETECTED
PDW BLD-RTO: 13.1 FL (ref 11.5–15)
PHENCYCLIDINE SCREEN URINE: NOT DETECTED
PLATELET # BLD: 276 E9/L (ref 130–450)
PMV BLD AUTO: 9.1 FL (ref 7–12)
POTASSIUM SERPL-SCNC: 4.2 MMOL/L (ref 3.5–5)
RBC # BLD: 3.87 E12/L (ref 3.5–5.5)
SARS-COV-2, NAAT: NOT DETECTED
SODIUM BLD-SCNC: 134 MMOL/L (ref 132–146)
TOTAL PROTEIN: 6.2 G/DL (ref 6.4–8.3)
WBC # BLD: 11 E9/L (ref 4.5–11.5)

## 2021-10-21 PROCEDURE — 1220000001 HC SEMI PRIVATE L&D R&B

## 2021-10-21 PROCEDURE — 36415 COLL VENOUS BLD VENIPUNCTURE: CPT

## 2021-10-21 PROCEDURE — 59514 CESAREAN DELIVERY ONLY: CPT | Performed by: ADVANCED PRACTICE MIDWIFE

## 2021-10-21 PROCEDURE — 6360000002 HC RX W HCPCS

## 2021-10-21 PROCEDURE — 3700000000 HC ANESTHESIA ATTENDED CARE: Performed by: OBSTETRICS & GYNECOLOGY

## 2021-10-21 PROCEDURE — 6370000000 HC RX 637 (ALT 250 FOR IP): Performed by: ANESTHESIOLOGY

## 2021-10-21 PROCEDURE — 3609079900 HC CESAREAN SECTION: Performed by: OBSTETRICS & GYNECOLOGY

## 2021-10-21 PROCEDURE — 7100000001 HC PACU RECOVERY - ADDTL 15 MIN: Performed by: OBSTETRICS & GYNECOLOGY

## 2021-10-21 PROCEDURE — 87635 SARS-COV-2 COVID-19 AMP PRB: CPT

## 2021-10-21 PROCEDURE — 2500000003 HC RX 250 WO HCPCS

## 2021-10-21 PROCEDURE — 3700000001 HC ADD 15 MINUTES (ANESTHESIA): Performed by: OBSTETRICS & GYNECOLOGY

## 2021-10-21 PROCEDURE — 6370000000 HC RX 637 (ALT 250 FOR IP): Performed by: OBSTETRICS & GYNECOLOGY

## 2021-10-21 PROCEDURE — 6360000002 HC RX W HCPCS: Performed by: ANESTHESIOLOGY

## 2021-10-21 PROCEDURE — 7100000000 HC PACU RECOVERY - FIRST 15 MIN: Performed by: OBSTETRICS & GYNECOLOGY

## 2021-10-21 PROCEDURE — 2580000003 HC RX 258: Performed by: OBSTETRICS & GYNECOLOGY

## 2021-10-21 PROCEDURE — 86850 RBC ANTIBODY SCREEN: CPT

## 2021-10-21 PROCEDURE — 80307 DRUG TEST PRSMV CHEM ANLYZR: CPT

## 2021-10-21 PROCEDURE — 80053 COMPREHEN METABOLIC PANEL: CPT

## 2021-10-21 PROCEDURE — 85027 COMPLETE CBC AUTOMATED: CPT

## 2021-10-21 PROCEDURE — 6360000002 HC RX W HCPCS: Performed by: OBSTETRICS & GYNECOLOGY

## 2021-10-21 PROCEDURE — 86900 BLOOD TYPING SEROLOGIC ABO: CPT

## 2021-10-21 PROCEDURE — G0480 DRUG TEST DEF 1-7 CLASSES: HCPCS

## 2021-10-21 PROCEDURE — 2709999900 HC NON-CHARGEABLE SUPPLY: Performed by: OBSTETRICS & GYNECOLOGY

## 2021-10-21 PROCEDURE — 86901 BLOOD TYPING SEROLOGIC RH(D): CPT

## 2021-10-21 PROCEDURE — 2580000003 HC RX 258

## 2021-10-21 RX ORDER — OXYCODONE HYDROCHLORIDE 5 MG/1
10 TABLET ORAL EVERY 4 HOURS PRN
Status: DISPENSED | OUTPATIENT
Start: 2021-10-21 | End: 2021-10-22

## 2021-10-21 RX ORDER — SODIUM CHLORIDE, SODIUM LACTATE, POTASSIUM CHLORIDE, CALCIUM CHLORIDE 600; 310; 30; 20 MG/100ML; MG/100ML; MG/100ML; MG/100ML
INJECTION, SOLUTION INTRAVENOUS CONTINUOUS PRN
Status: DISCONTINUED | OUTPATIENT
Start: 2021-10-21 | End: 2021-10-21 | Stop reason: SDUPTHER

## 2021-10-21 RX ORDER — SODIUM CHLORIDE 0.9 % (FLUSH) 0.9 %
10 SYRINGE (ML) INJECTION EVERY 12 HOURS SCHEDULED
Status: DISCONTINUED | OUTPATIENT
Start: 2021-10-21 | End: 2021-10-23 | Stop reason: HOSPADM

## 2021-10-21 RX ORDER — ONDANSETRON 2 MG/ML
4 INJECTION INTRAMUSCULAR; INTRAVENOUS EVERY 6 HOURS PRN
Status: ACTIVE | OUTPATIENT
Start: 2021-10-21 | End: 2021-10-22

## 2021-10-21 RX ORDER — ACETAMINOPHEN 500 MG
1000 TABLET ORAL EVERY 6 HOURS PRN
Status: DISPENSED | OUTPATIENT
Start: 2021-10-21 | End: 2021-10-22

## 2021-10-21 RX ORDER — KETOROLAC TROMETHAMINE 30 MG/ML
30 INJECTION, SOLUTION INTRAMUSCULAR; INTRAVENOUS EVERY 6 HOURS
Status: COMPLETED | OUTPATIENT
Start: 2021-10-21 | End: 2021-10-22

## 2021-10-21 RX ORDER — SODIUM CHLORIDE, SODIUM LACTATE, POTASSIUM CHLORIDE, AND CALCIUM CHLORIDE .6; .31; .03; .02 G/100ML; G/100ML; G/100ML; G/100ML
1000 INJECTION, SOLUTION INTRAVENOUS ONCE
Status: DISCONTINUED | OUTPATIENT
Start: 2021-10-21 | End: 2021-10-23 | Stop reason: HOSPADM

## 2021-10-21 RX ORDER — SODIUM CHLORIDE 0.9 % (FLUSH) 0.9 %
10 SYRINGE (ML) INJECTION PRN
Status: DISCONTINUED | OUTPATIENT
Start: 2021-10-21 | End: 2021-10-23 | Stop reason: HOSPADM

## 2021-10-21 RX ORDER — NALOXONE HYDROCHLORIDE 0.4 MG/ML
0.4 INJECTION, SOLUTION INTRAMUSCULAR; INTRAVENOUS; SUBCUTANEOUS PRN
Status: DISCONTINUED | OUTPATIENT
Start: 2021-10-21 | End: 2021-10-23 | Stop reason: HOSPADM

## 2021-10-21 RX ORDER — SODIUM CHLORIDE 9 MG/ML
25 INJECTION, SOLUTION INTRAVENOUS PRN
Status: DISCONTINUED | OUTPATIENT
Start: 2021-10-21 | End: 2021-10-23 | Stop reason: HOSPADM

## 2021-10-21 RX ORDER — OXYCODONE HYDROCHLORIDE 5 MG/1
5 TABLET ORAL EVERY 4 HOURS PRN
Status: ACTIVE | OUTPATIENT
Start: 2021-10-21 | End: 2021-10-22

## 2021-10-21 RX ORDER — BUPIVACAINE HYDROCHLORIDE 7.5 MG/ML
INJECTION, SOLUTION INTRASPINAL PRN
Status: DISCONTINUED | OUTPATIENT
Start: 2021-10-21 | End: 2021-10-21 | Stop reason: SDUPTHER

## 2021-10-21 RX ORDER — NALBUPHINE HCL 10 MG/ML
5 AMPUL (ML) INJECTION EVERY 4 HOURS PRN
Status: DISCONTINUED | OUTPATIENT
Start: 2021-10-21 | End: 2021-10-23 | Stop reason: HOSPADM

## 2021-10-21 RX ORDER — MEPERIDINE HYDROCHLORIDE 25 MG/ML
12.5 INJECTION INTRAMUSCULAR; INTRAVENOUS; SUBCUTANEOUS EVERY 6 HOURS PRN
Status: DISPENSED | OUTPATIENT
Start: 2021-10-21 | End: 2021-10-22

## 2021-10-21 RX ORDER — DIPHENHYDRAMINE HYDROCHLORIDE 50 MG/ML
12.5 INJECTION INTRAMUSCULAR; INTRAVENOUS EVERY 6 HOURS PRN
Status: DISCONTINUED | OUTPATIENT
Start: 2021-10-21 | End: 2021-10-23 | Stop reason: HOSPADM

## 2021-10-21 RX ORDER — TRISODIUM CITRATE DIHYDRATE AND CITRIC ACID MONOHYDRATE 500; 334 MG/5ML; MG/5ML
30 SOLUTION ORAL ONCE
Status: COMPLETED | OUTPATIENT
Start: 2021-10-21 | End: 2021-10-21

## 2021-10-21 RX ORDER — CEFAZOLIN SODIUM 2 G/50ML
SOLUTION INTRAVENOUS PRN
Status: DISCONTINUED | OUTPATIENT
Start: 2021-10-21 | End: 2021-10-21 | Stop reason: SDUPTHER

## 2021-10-21 RX ORDER — MORPHINE SULFATE 1 MG/ML
INJECTION, SOLUTION EPIDURAL; INTRATHECAL; INTRAVENOUS PRN
Status: DISCONTINUED | OUTPATIENT
Start: 2021-10-21 | End: 2021-10-21 | Stop reason: SDUPTHER

## 2021-10-21 RX ORDER — ONDANSETRON 2 MG/ML
INJECTION INTRAMUSCULAR; INTRAVENOUS PRN
Status: DISCONTINUED | OUTPATIENT
Start: 2021-10-21 | End: 2021-10-21 | Stop reason: SDUPTHER

## 2021-10-21 RX ORDER — SODIUM CHLORIDE, SODIUM LACTATE, POTASSIUM CHLORIDE, CALCIUM CHLORIDE 600; 310; 30; 20 MG/100ML; MG/100ML; MG/100ML; MG/100ML
INJECTION, SOLUTION INTRAVENOUS CONTINUOUS
Status: DISCONTINUED | OUTPATIENT
Start: 2021-10-21 | End: 2021-10-23 | Stop reason: HOSPADM

## 2021-10-21 RX ORDER — FENTANYL CITRATE 50 UG/ML
INJECTION, SOLUTION INTRAMUSCULAR; INTRAVENOUS PRN
Status: DISCONTINUED | OUTPATIENT
Start: 2021-10-21 | End: 2021-10-21 | Stop reason: SDUPTHER

## 2021-10-21 RX ADMIN — CEFAZOLIN SODIUM 3000 MG: 2 SOLUTION INTRAVENOUS at 09:00

## 2021-10-21 RX ADMIN — PHENYLEPHRINE HYDROCHLORIDE 200 MCG: 10 INJECTION INTRAVENOUS at 09:00

## 2021-10-21 RX ADMIN — PHENYLEPHRINE HYDROCHLORIDE 200 MCG: 10 INJECTION INTRAVENOUS at 09:28

## 2021-10-21 RX ADMIN — Medication 999 ML/HR: at 09:22

## 2021-10-21 RX ADMIN — PHENYLEPHRINE HYDROCHLORIDE 100 MCG: 10 INJECTION INTRAVENOUS at 09:15

## 2021-10-21 RX ADMIN — OXYCODONE 10 MG: 5 TABLET ORAL at 15:01

## 2021-10-21 RX ADMIN — PHENYLEPHRINE HYDROCHLORIDE 200 MCG: 10 INJECTION INTRAVENOUS at 09:45

## 2021-10-21 RX ADMIN — PHENYLEPHRINE HYDROCHLORIDE 200 MCG: 10 INJECTION INTRAVENOUS at 09:39

## 2021-10-21 RX ADMIN — MEPERIDINE HYDROCHLORIDE 12.5 MG: 25 INJECTION, SOLUTION INTRAMUSCULAR; INTRAVENOUS; SUBCUTANEOUS at 13:36

## 2021-10-21 RX ADMIN — ONDANSETRON 4 MG: 2 INJECTION INTRAMUSCULAR; INTRAVENOUS at 09:05

## 2021-10-21 RX ADMIN — SODIUM CHLORIDE, POTASSIUM CHLORIDE, SODIUM LACTATE AND CALCIUM CHLORIDE: 600; 310; 30; 20 INJECTION, SOLUTION INTRAVENOUS at 08:34

## 2021-10-21 RX ADMIN — PHENYLEPHRINE HYDROCHLORIDE 100 MCG: 10 INJECTION INTRAVENOUS at 09:07

## 2021-10-21 RX ADMIN — PHENYLEPHRINE HYDROCHLORIDE 100 MCG: 10 INJECTION INTRAVENOUS at 09:16

## 2021-10-21 RX ADMIN — FENTANYL CITRATE 50 MCG: 50 INJECTION, SOLUTION INTRAMUSCULAR; INTRAVENOUS at 09:44

## 2021-10-21 RX ADMIN — PHENYLEPHRINE HYDROCHLORIDE 200 MCG: 10 INJECTION INTRAVENOUS at 09:50

## 2021-10-21 RX ADMIN — KETOROLAC TROMETHAMINE 30 MG: 30 INJECTION, SOLUTION INTRAMUSCULAR at 18:16

## 2021-10-21 RX ADMIN — OXYCODONE 10 MG: 5 TABLET ORAL at 19:23

## 2021-10-21 RX ADMIN — FENTANYL CITRATE 50 MCG: 50 INJECTION, SOLUTION INTRAMUSCULAR; INTRAVENOUS at 09:45

## 2021-10-21 RX ADMIN — PHENYLEPHRINE HYDROCHLORIDE 200 MCG: 10 INJECTION INTRAVENOUS at 09:10

## 2021-10-21 RX ADMIN — MORPHINE SULFATE 0.15 MG: 1 INJECTION, SOLUTION EPIDURAL; INTRATHECAL; INTRAVENOUS at 08:56

## 2021-10-21 RX ADMIN — CEFAZOLIN 3000 MG: 10 INJECTION, POWDER, FOR SOLUTION INTRAVENOUS at 08:39

## 2021-10-21 RX ADMIN — SODIUM CHLORIDE, POTASSIUM CHLORIDE, SODIUM LACTATE AND CALCIUM CHLORIDE: 600; 310; 30; 20 INJECTION, SOLUTION INTRAVENOUS at 10:10

## 2021-10-21 RX ADMIN — PHENYLEPHRINE HYDROCHLORIDE 100 MCG: 10 INJECTION INTRAVENOUS at 09:58

## 2021-10-21 RX ADMIN — PHENYLEPHRINE HYDROCHLORIDE 200 MCG: 10 INJECTION INTRAVENOUS at 09:20

## 2021-10-21 RX ADMIN — KETOROLAC TROMETHAMINE 30 MG: 30 INJECTION, SOLUTION INTRAMUSCULAR at 11:28

## 2021-10-21 RX ADMIN — BUPIVACAINE HYDROCHLORIDE IN DEXTROSE 2 ML: 7.5 INJECTION, SOLUTION SUBARACHNOID at 08:56

## 2021-10-21 RX ADMIN — SODIUM CITRATE AND CITRIC ACID MONOHYDRATE 30 ML: 500; 334 SOLUTION ORAL at 08:38

## 2021-10-21 RX ADMIN — DIPHENHYDRAMINE HYDROCHLORIDE 12.5 MG: 50 INJECTION, SOLUTION INTRAMUSCULAR; INTRAVENOUS at 11:29

## 2021-10-21 RX ADMIN — PHENYLEPHRINE HYDROCHLORIDE 200 MCG: 10 INJECTION INTRAVENOUS at 09:03

## 2021-10-21 ASSESSMENT — PULMONARY FUNCTION TESTS
PIF_VALUE: 1
PIF_VALUE: 0
PIF_VALUE: 1
PIF_VALUE: 0
PIF_VALUE: 0
PIF_VALUE: 1
PIF_VALUE: 0
PIF_VALUE: 0
PIF_VALUE: 1
PIF_VALUE: 0
PIF_VALUE: 1
PIF_VALUE: 0
PIF_VALUE: 1
PIF_VALUE: 1
PIF_VALUE: 0
PIF_VALUE: 1
PIF_VALUE: 1
PIF_VALUE: 0
PIF_VALUE: 1
PIF_VALUE: 0
PIF_VALUE: 1
PIF_VALUE: 0
PIF_VALUE: 1
PIF_VALUE: 0
PIF_VALUE: 1
PIF_VALUE: 0
PIF_VALUE: 1
PIF_VALUE: 0
PIF_VALUE: 1
PIF_VALUE: 0
PIF_VALUE: 1
PIF_VALUE: 0
PIF_VALUE: 1
PIF_VALUE: 0
PIF_VALUE: 1
PIF_VALUE: 0
PIF_VALUE: 1
PIF_VALUE: 1
PIF_VALUE: 0

## 2021-10-21 ASSESSMENT — PAIN SCALES - GENERAL
PAINLEVEL_OUTOF10: 6
PAINLEVEL_OUTOF10: 7
PAINLEVEL_OUTOF10: 7
PAINLEVEL_OUTOF10: 0
PAINLEVEL_OUTOF10: 6
PAINLEVEL_OUTOF10: 6

## 2021-10-21 ASSESSMENT — LIFESTYLE VARIABLES: SMOKING_STATUS: 0

## 2021-10-21 NOTE — ANESTHESIA PRE PROCEDURE
Department of Anesthesiology  Preprocedure Note       Name:  Prerna Recinos   Age:  19 y.o.  :  2000                                          MRN:  [id number]         Date:  10/21/2021      Surgeon: Ronald Umana):  Rebekah Thomas MD    Procedure: Procedure(s):  REPEAT  SECTION    Medications prior to admission:   Prior to Admission medications    Medication Sig Start Date End Date Taking? Authorizing Provider   Prenatal MV-Min-Fe Fum-FA-DHA (PRENATAL 1 PO) Take by mouth    Historical Provider, MD   ferrous sulfate (IRON 325) 325 (65 Fe) MG tablet Take 325 mg by mouth daily (with breakfast)    Historical Provider, MD   ibuprofen (ADVIL;MOTRIN) 800 MG tablet Take 1 tablet by mouth every 8 hours  Patient not taking: Reported on 2020   Cristofer Moise DO       Current medications:    Current Facility-Administered Medications   Medication Dose Route Frequency Provider Last Rate Last Admin    lactated ringers infusion   IntraVENous Continuous Rebekah Thomas MD        lactated ringers bolus  1,000 mL IntraVENous Once Rebekah Thomas MD        sodium chloride flush 0.9 % injection 10 mL  10 mL IntraVENous 2 times per day Rebekah Thomas MD        sodium chloride flush 0.9 % injection 10 mL  10 mL IntraVENous PRN Rebekah Thomas MD        0.9 % sodium chloride infusion  25 mL IntraVENous PRN Rebekah Thomas MD        citric acid-sodium citrate (BICITRA) solution 30 mL  30 mL Oral Once Rebekah Thomas MD        ceFAZolin (ANCEF) 3,000 mg in dextrose 5 % 100 mL IVPB  3,000 mg IntraVENous Once Rebekah Thomas MD           Allergies: Allergies   Allergen Reactions    Effexor [Venlafaxine Hcl] Shortness Of Breath    Paxil [Paroxetine Hcl] Shortness Of Breath    Powder      Powder in the non-latex gloves.  Is NOT allergic to latex       Problem List:    Patient Active Problem List   Diagnosis Code    Impacted third molar tooth K01.1    Obesity, Class III, BMI 40-49.9 (morbid obesity) [facility] E66.01    Cannabis dependence ([facility] F12.20    Abdominal pain affecting pregnancy O26.899, R10.9    36 weeks gestation of pregnancy Z3A.36    History of  section Z98.891       Past Medical History:        Diagnosis Date    Acid reflux disease     Anemia     hx of anemia    Anxiety     Depression     anxiety, bipolar    Hepatitis C     Ingrowing toenail of right foot     Thyroid disease     slightly abnormal labs, no medication        Past Surgical History:        Procedure Laterality Date    ABDOMEN SURGERY  2017     section     SECTION      NASAL/SINUS ENDOSCOPY      WISDOM TOOTH EXTRACTION  11/18/15    X4 WISDOM TEETH EXTRACTED       Social History:    Social History     Tobacco Use    Smoking status: Former Smoker     Packs/day: 0.50     Types: Cigarettes     Quit date: 2021     Years since quittin.7    Smokeless tobacco: Never Used    Tobacco comment: stopped with pregnancy   Substance Use Topics    Alcohol use:  No                                Counseling given: Not Answered  Comment: stopped with pregnancy      Vital Signs (Current):   Vitals:    10/21/21 0524 10/21/21 0743   BP: 131/72 113/68   Pulse: 93 55   Resp: 17 18   Temp: 97.5 °F (36.4 °C) 97.8 °F (36.6 °C)   TempSrc: Oral    SpO2: 98%                                               BP Readings from Last 3 Encounters:   10/21/21 113/68   21 (!) 106/57   20 137/75       NPO Status:                                                                                 BMI:   Wt Readings from Last 3 Encounters:   20 232 lb (105.2 kg)   04/10/20 215 lb (97.5 kg)   18 197 lb (89.4 kg)     There is no height or weight on file to calculate BMI.    CBC:   Lab Results   Component Value Date    WBC 11.0 10/21/2021    RBC 3.87 10/21/2021    HGB 11.4 10/21/2021    HCT 33.7 10/21/2021    MCV 87.1 10/21/2021    RDW 13.1 10/21/2021     10/21/2021       CMP:   Lab Results   Component Value Date  10/21/2021    K 4.2 10/21/2021     10/21/2021    CO2 18 10/21/2021    BUN 11 10/21/2021    CREATININE 0.5 10/21/2021    GFRAA >60 10/21/2021    LABGLOM >60 10/21/2021    GLUCOSE 91 10/21/2021    PROT 6.2 10/21/2021    CALCIUM 8.9 10/21/2021    BILITOT 0.4 10/21/2021    ALKPHOS 133 10/21/2021    AST 15 10/21/2021    ALT 8 10/21/2021       POC Tests: No results for input(s): POCGLU, POCNA, POCK, POCCL, POCBUN, POCHEMO, POCHCT in the last 72 hours. Coags: No results found for: PROTIME, INR, APTT    HCG (If Applicable):   Lab Results   Component Value Date    PREGTESTUR Positive 09/17/2020        ABGs: No results found for: PHART, PO2ART, KNV8QST, XZL8SYZ, BEART, N9PVIAXZ     Type & Screen (If Applicable):  No results found for: LABABO, LABRH    Drug/Infectious Status (If Applicable):  No results found for: HIV, HEPCAB    COVID-19 Screening (If Applicable):   Lab Results   Component Value Date    COVID19 Not Detected 10/21/2021           Anesthesia Evaluation  Patient summary reviewed no history of anesthetic complications:   Airway: Mallampati: II  TM distance: >3 FB   Neck ROM: full  Mouth opening: > = 3 FB Dental: normal exam         Pulmonary: breath sounds clear to auscultation      (-) not a current smoker                           Cardiovascular:Negative CV ROS            Rhythm: regular  Rate: normal                    Neuro/Psych:   (+) psychiatric history:depression/anxiety             GI/Hepatic/Renal:   (+) GERD:, hepatitis: C, liver disease:, morbid obesity          Endo/Other:                      ROS comment: Cannabis dependence  Abdominal:   (+) obese,           Vascular: negative vascular ROS. Other Findings:           Anesthesia Plan      spinal     ASA 3       Induction: intravenous. MIPS: Postoperative opioids intended and Prophylactic antiemetics administered. Anesthetic plan and risks discussed with patient and spouse.       Plan discussed with CRNA and surgical team.          DOS STAFF ADDENDUM:    Pt seen and examined, chart reviewed (including anesthesia, drug and allergy history). Anesthetic plan, risks, benefits, alternatives, and personnel involved discussed with patient. Patient verbalized an understanding and agrees to proceed. Plan discussed with care team members and agreed upon.     Cheryl Sellers MD  Staff Anesthesiologist  8:13 AM      Cheryl Sellers MD   10/21/2021

## 2021-10-21 NOTE — ANESTHESIA PROCEDURE NOTES
Spinal Block    Patient location during procedure: OB  Reason for block: primary anesthetic and at surgeon's request  Staffing  Other anesthesia staff: Kayleen James RN  Preanesthetic Checklist  Completed: patient identified, IV checked, site marked, risks and benefits discussed, surgical consent, monitors and equipment checked, pre-op evaluation, timeout performed, anesthesia consent given, oxygen available and patient being monitored  Spinal Block  Patient position: sitting  Prep: ChloraPrep  Patient monitoring: continuous pulse ox and frequent blood pressure checks  Approach: midline  Location: L3/L4  Provider prep: mask and sterile gloves  Local infiltration: lidocaine  Dose: 0.2  Agent: bupivacaine  Adjuvant: duramorph  Needle  Needle type: Pencan   Needle gauge: 25 G  Needle length: 3.5 in  Assessment  Swirl obtained: Yes  CSF: clear  Attempts: 1  Hemodynamics: stable

## 2021-10-21 NOTE — PROGRESS NOTES
Recover period ending. Vitals remained stable. Dressing to incision clean, dry, and intact.  placed skin to skin with mother. Baby alert, color pink and regular respirations. Skin to skin teaching provided to mother and father of baby at bedside. Both verbalize understanding of proper positioning without questions.

## 2021-10-21 NOTE — OP NOTE
Operative Note      Patient: Tiffany Kent  YOB: 2000  MRN: [id number]    Date of Procedure: 10/21/2021    Pre-Op Diagnosis: FULL TERM pregnancy with a previous lower transverse  does not desire vaginal birth after  desires repeat low transverse     Post-Op Diagnosis: Same       Procedure(s):  REPEAT  SECTION    Surgeon(s):  Umang Kessler MD    Assistant:   First Assistant: CHRISTINE Worley CNM    Anesthesia: Spinal    Estimated Blood Loss (mL): 218     Complications: None      Under spinal anesthesia the abdomen was prepared and draped . In the absence of a resident I assisted  Dr Hernan Whitney who performed a , with retraction, exposure and delivery of a living female infant and suture management/cutting throughout the case. Then the uterus and the abdomen were closed. Procedure was well tolerated.       Electronically signed by CHRISTINE Worley CNM on 10/21/2021 at 11:39 AM

## 2021-10-21 NOTE — CARE COORDINATION
10/21/2021: SS Note:  SS Consult noted regarding \" + UDS for marijuana\" \" pt has a medical marijuana card\" pt is a scheduled  today, sw will follow with pt after delivery to complete ADY- assessment and for UDS results on , nursing informed.  Electronically signed by DAVE Calderón on 10/21/2021 at 9:12 AM

## 2021-10-21 NOTE — H&P
Department of Obstetrics and Gynecology   Obstetrics History and Physical      Gabi Agarwal  10/21/2021  [id number]    CHIEF COMPLAINT:  Repeat c/section,    HISTORY OF PRESENT ILLNESS:      The patient is a 19 y.o. female  at 43w3d. With a history of previous  section here for repeat  section, does not desire vaginal birth after , desires repeat low transverse   OB History        3    Para   1    Term   1            AB   1    Living   1       SAB   1    TAB        Ectopic        Molar        Multiple        Live Births   1            Patient presents with a chief complaint as above and is being admitted for repeat c/section. H/O prev c/section times 1    Estimated Due Date: Estimated Date of Delivery: 10/24/21    PRENATAL CARE:    Complicated by:   Patient Active Problem List   Diagnosis Code    Impacted third molar tooth K01.1    Obesity, Class III, BMI 40-49.9 (morbid obesity) ([facility]) E66.01    Cannabis dependence ([facility] F12.20    Abdominal pain affecting pregnancy O26.899, R10.9    36 weeks gestation of pregnancy Z3A.36    History of  section Z98.891       PAST OB HISTORY  OB History        3    Para   1    Term   1            AB   1    Living   1       SAB   1    TAB        Ectopic        Molar        Multiple        Live Births   1              Prev c/section times one  Past Medical History:        Diagnosis Date    Acid reflux disease     Anemia     hx of anemia    Anxiety     Depression     anxiety, bipolar    Hepatitis C     Ingrowing toenail of right foot     Thyroid disease     slightly abnormal labs, no medication      Past Surgical History:        Procedure Laterality Date    ABDOMEN SURGERY  2017     section     SECTION      NASAL/SINUS ENDOSCOPY      WISDOM TOOTH EXTRACTION  11/18/15    X4 WISDOM TEETH EXTRACTED     Allergies:  Effexor [venlafaxine hcl], Paxil [paroxetine hcl], and Powder  Social History:    Social History     Socioeconomic History    Marital status: Single     Spouse name: Not on file    Number of children: Not on file    Years of education: Not on file    Highest education level: Not on file   Occupational History    Not on file   Tobacco Use    Smoking status: Former Smoker     Packs/day: 0.50     Types: Cigarettes     Quit date: 2021     Years since quittin.7    Smokeless tobacco: Never Used    Tobacco comment: stopped with pregnancy   Vaping Use    Vaping Use: Former    Substances: Nicotine, CBD    Devices: Pre-filled pod   Substance and Sexual Activity    Alcohol use: No    Drug use: Not Currently     Types: Methamphetamines, Marijuana, Opiates , IV     Comment: before knew pregnant    Sexual activity: Yes     Partners: Male   Other Topics Concern    Not on file   Social History Narrative    Not on file     Social Determinants of Health     Financial Resource Strain:     Difficulty of Paying Living Expenses:    Food Insecurity:     Worried About Running Out of Food in the Last Year:     Ran Out of Food in the Last Year:    Transportation Needs:     Lack of Transportation (Medical):      Lack of Transportation (Non-Medical):    Physical Activity:     Days of Exercise per Week:     Minutes of Exercise per Session:    Stress:     Feeling of Stress :    Social Connections:     Frequency of Communication with Friends and Family:     Frequency of Social Gatherings with Friends and Family:     Attends Congregation Services:     Active Member of Clubs or Organizations:     Attends Club or Organization Meetings:     Marital Status:    Intimate Partner Violence:     Fear of Current or Ex-Partner:     Emotionally Abused:     Physically Abused:     Sexually Abused:      Family History:       Adopted: Yes     Medications Prior to Admission:  Medications Prior to Admission: Prenatal MV-Min-Fe Fum-FA-DHA (PRENATAL 1 PO), Take by mouth  ferrous sulfate (IRON 325) 325 (65 Fe) MG tablet, Take 325 mg by mouth daily (with breakfast)  ibuprofen (ADVIL;MOTRIN) 800 MG tablet, Take 1 tablet by mouth every 8 hours (Patient not taking: Reported on 9/17/2020)    REVIEW OF SYSTEMS:    CONSTITUTIONAL:  negative  RESPIRATORY:  negative  CARDIOVASCULAR:  negative  GASTROINTESTINAL:  Negative  GENITOURINARY: Negative  ALLERGIC/IMMUNOLOGIC:  negative  NEUROLOGICAL:  negative  BEHAVIOR/PSYCH:  negative    PHYSICAL EXAM:  Blood pressure 131/72, pulse 93, temperature 97.5 °F (36.4 °C), temperature source Oral, resp. rate 17, last menstrual period 01/17/2021, SpO2 98 %, unknown if currently breastfeeding. General appearance:  awake, alert, cooperative, no apparent distress, and appears stated age  Abdomen:  Gravid  uterus, consistent with her gestational age 43w3d, irreg  Uterine contractions. Abdominal scar. Fetal heart rate:  Reassuring.  140,with accels and moderate variability,no decels  Pelvis:  Adequate pelvis  Cervix: soft   Closed   50%    -3  Presentation: CEPHALIC  Membranes:  intact  Extremities: nontender bilaterally,edema1+    DATA:  Labs:  CBC:   Lab Results   Component Value Date    WBC 11.0 10/21/2021    RBC 3.87 10/21/2021    HGB 11.4 10/21/2021    HCT 33.7 10/21/2021    MCV 87.1 10/21/2021    RDW 13.1 10/21/2021     10/21/2021     CMP:    Lab Results   Component Value Date     10/21/2021    K 4.2 10/21/2021     10/21/2021    CO2 18 10/21/2021    BUN 11 10/21/2021    PROT 6.2 10/21/2021     U/A:  No components found for: Yelitza Candle, USPGRAV, UPH, UPROTEIN, UGLUCOSE, UKETONE, UBILI, UBLOOD, Manohar, UUROBIL, [place], [facility], [place], [facility], [place], [place], [place]  TSH:    Lab Results   Component Value Date    TSH 1.780 03/17/2017     RPR:  No results found for: RPR  RUBELLA: No results found for: RUBELLAIGG  HEPBSAG: No results found for: HBSAGI  HIV:  No results found for: HIV  HgBA1c:  No components found for: HGBA1C  Blood Type: No results found for: ABOINT  RH:  No results found for: ANATITER, C3, C4, RF  Antibody Screen:  No components found for: ABSCINT  Gonorrhea:  No components found for: PRBCHLGC  Chlamydia:  No components found for: CCHLAM  gbs-STREP B SCREEN: No results found for: GBSAG    No orders to display       Results for orders placed or performed during the hospital encounter of 10/21/21   COVID-19, Rapid    Specimen: Nasopharyngeal Swab; Nasal   Result Value Ref Range    SARS-CoV-2, NAAT Not Detected Not Detected   Comprehensive metabolic panel   Result Value Ref Range    Sodium 134 132 - 146 mmol/L    Potassium 4.2 3.5 - 5.0 mmol/L    Chloride 104 98 - 107 mmol/L    CO2 18 (L) 22 - 29 mmol/L    Anion Gap 12 7 - 16 mmol/L    Glucose 91 74 - 99 mg/dL    BUN 11 6 - 20 mg/dL    CREATININE 0.5 0.5 - 1.0 mg/dL    GFR Non-African American >60 >=60 mL/min/1.73    GFR African American >60     Calcium 8.9 8.6 - 10.2 mg/dL    Total Protein 6.2 (L) 6.4 - 8.3 g/dL    Albumin 3.2 (L) 3.5 - 5.2 g/dL    Total Bilirubin 0.4 0.0 - 1.2 mg/dL    Alkaline Phosphatase 133 (H) 35 - 104 U/L    ALT 8 0 - 32 U/L    AST 15 0 - 31 U/L   CBC   Result Value Ref Range    WBC 11.0 4.5 - 11.5 E9/L    RBC 3.87 3.50 - 5.50 E12/L    Hemoglobin 11.4 (L) 11.5 - 15.5 g/dL    Hematocrit 33.7 (L) 34.0 - 48.0 %    MCV 87.1 80.0 - 99.9 fL    MCH 29.5 26.0 - 35.0 pg    MCHC 33.8 32.0 - 34.5 %    RDW 13.1 11.5 - 15.0 fL    Platelets 794 405 - 786 E9/L    MPV 9.1 7.0 - 12.0 fL   DRUG SCREEN MULTI URINE   Result Value Ref Range    Amphetamine Screen, Urine NOT DETECTED Negative <1000 ng/mL    Barbiturate Screen, Ur NOT DETECTED Negative < 200 ng/mL    Benzodiazepine Screen, Urine NOT DETECTED Negative < 200 ng/mL    Cannabinoid Scrn, Ur POSITIVE (A) Negative < 50ng/mL    Cocaine Metabolite Screen, Urine NOT DETECTED Negative < 300 ng/mL    Opiate Scrn, Ur NOT DETECTED Negative < 300ng/mL    PCP Screen, Urine NOT DETECTED Negative < 25 ng/mL    Methadone Screen, Urine NOT DETECTED Negative <300 ng/mL    Oxycodone Urine NOT DETECTED Negative <100 ng/mL    FENTANYL SCREEN, URINE NOT DETECTED Negative <1 ng/mL    Drug Screen Comment: see below        ASSESSMENT AND PLAN:full term pregnancy,   Previous c/section x1      Repeat low transverse   Active Problems:    History of  section  Resolved Problems:    * No resolved hospital problems. *    Discussed with patient regarding repeat  section and postoperative complications and short-term and long-term consequences. Pain bleeding infection injury to surrounding organs, abnormal placentation and its consequences namely excessive bleeding, uncontrollable bleeding, possible hysterectomy, blood transfusion and related complications.     Labor: OBSERVATION, preop c/s orders,IV antibiotic,  Fetus: Reassuring  GBS: Negative  IVFluids,labs,Anesthesia and peds are informed,  Transfer to OR when ready        Electronically signed by Trudy Larson MD on 10/21/2021 at 7:30 AM

## 2021-10-21 NOTE — OP NOTE
Operative Note      Patient: Elliott Wang  YOB: 2000  MRN: [id number]    Date of Procedure: 10/21/2021    Pre-Op Diagnosis: FULL TERM pregnancy with a previous lower transverse  does not desire vaginal birth after  desires repeat low transverse     Post-Op Diagnosis: Same       Procedure(s):  REPEAT  SECTION repeat low transverse  female infant with a cord around the neck, 7 pound 11 ounce with Apgar 9 /9    Surgeon(s):  Leti Johnson MD    Assistant:   First Assistant: CHRISTINE Green CNM    Anesthesia: Spinal    Estimated Blood Loss (mL): 525 cc    Complications: None    Specimens:   * No specimens in log *    Implants:  * No implants in log *      Drains:   Urethral Catheter Non-latex 16 fr (Active)       Findings:     Detailed Description of Procedure:   Patient Name: Elliott Wang  YOB: 2000  MRN:    [id number]    Date: 10/21/2021    PROCEDURE IN BRIEF: The patient, under spinal anesthesia anesthesia, in the supine    position with left lateral tilt and with a Tinsley catheter in place, parts were painted and   draped as usual.   A Pfannenstiel incision made on  the previous scar and the abdomen was opened   in layers in the standard manner. Hemostasis achieved in the subcutaneous   layers. An incision was made in the fascia and then the fascia was    from the underlying  muscles by sharp and blunt dissection in the   cephalad as well as in the caudal region and thereafter muscles were split in   the center. Peritoneum was opened caudally and then the incision was   extended cephalad . A bladder blade was placed and the   bladder flap was incised in a semi-lunar fashion and the bladder is    from the underlying lower segment by blunt dissection and   thereafter the lower segment is exposed. An incision is made in the lower   segment and deepened until the cavity is reached. Clear fluid started   leaking.  The incision is split apart in a transverse manner. The head is   delivered into the field. Mouth and nares were suctioned. The anterior   shoulder is delivered. The posterior shoulder is delivered. Simultaneous   fundal pressure was applied by the assistant and then the trunk is delivered   followed by the lower extremities. Cord is clamped, cut between the clamps,   baby cried immediately after birth. The baby was handed over to the nurse. Then placenta, membranes and cord delivered. The patient has been given   Pitocin in IV fluids. Uterus is firm and well contracted. Uterus is exteriorized,Lopez clamps are placed on edges of uterine incision  Uterine cavity is cleansed with moist tape and ,Cervical canal is opened with ring forceps and ring forceps is placed off the sterile field. and uterine   incision is sutured in 2 layers  with the help of 0 Vicryl continuous   locking stitches with a 2 pieces of suture material for the first layer and   then the second layer imbricating the first layer with the help of 2 pieces   of suture material hemostasis observed Then the bladder  peritoneum was placed together with   the help of 2-0 Vicryl continuous nonlocking stitches and the uterus was   placed back into the abdominal cavity. At this point all the clots are removed from the paracolic gutters on either   side and hemostasis observed. parietal  peritoneum is placed together the help of 2-0 Vicryl continuous nonlocking   sutures and hemostasis is observed in muscle layers. then the fascia is placed together with the help of 0 Vicryl   continuous nonlocking stitches with the help of 2 pieces suture material and   then subcutaneous fat is placed together with the help of interrupted sutures   and the skin is stapled together with insorb. Steri-Strips are placed. A pressure   dressing is placed. The patient is in stable condition.  Patient is   transferred to the recovery room with stable vital signs and in stable condition.      Signed:   Dr Isidro Martell M.D.  10/21/2021  11:28 AM        Electronically signed by Isidro Martell MD on 10/21/2021 at 11:27 AM

## 2021-10-22 LAB
BASOPHILS ABSOLUTE: 0.04 E9/L (ref 0–0.2)
BASOPHILS RELATIVE PERCENT: 0.4 % (ref 0–2)
EOSINOPHILS ABSOLUTE: 0.15 E9/L (ref 0.05–0.5)
EOSINOPHILS RELATIVE PERCENT: 1.4 % (ref 0–6)
HCT VFR BLD CALC: 34 % (ref 34–48)
HEMOGLOBIN: 11.1 G/DL (ref 11.5–15.5)
IMMATURE GRANULOCYTES #: 0.06 E9/L
IMMATURE GRANULOCYTES %: 0.6 % (ref 0–5)
INTEGRITY CHECK, CREATININE, URINE: 80.5
INTEGRITY CHECK, OXIDANT, URINE: <40
INTEGRITY CHECK, PH, URINE: 6.3 (ref 4.5–9)
INTEGRITY CHECK, SPECIFIC GRAVITY, URINE: 1.02 (ref 1–1.03)
INTEGRITY CHECK, SPECIMEN INTEGRITY, URINE: NORMAL
LYMPHOCYTES ABSOLUTE: 2.16 E9/L (ref 1.5–4)
LYMPHOCYTES RELATIVE PERCENT: 20 % (ref 20–42)
MCH RBC QN AUTO: 30.2 PG (ref 26–35)
MCHC RBC AUTO-ENTMCNC: 32.6 % (ref 32–34.5)
MCV RBC AUTO: 92.6 FL (ref 80–99.9)
MONOCYTES ABSOLUTE: 0.77 E9/L (ref 0.1–0.95)
MONOCYTES RELATIVE PERCENT: 7.1 % (ref 2–12)
NEUTROPHILS ABSOLUTE: 7.63 E9/L (ref 1.8–7.3)
NEUTROPHILS RELATIVE PERCENT: 70.5 % (ref 43–80)
PDW BLD-RTO: 13.4 FL (ref 11.5–15)
PLATELET # BLD: 204 E9/L (ref 130–450)
PMV BLD AUTO: 9.8 FL (ref 7–12)
RBC # BLD: 3.67 E12/L (ref 3.5–5.5)
WBC # BLD: 10.8 E9/L (ref 4.5–11.5)

## 2021-10-22 PROCEDURE — 6370000000 HC RX 637 (ALT 250 FOR IP): Performed by: ANESTHESIOLOGY

## 2021-10-22 PROCEDURE — 1220000001 HC SEMI PRIVATE L&D R&B

## 2021-10-22 PROCEDURE — 2580000003 HC RX 258: Performed by: OBSTETRICS & GYNECOLOGY

## 2021-10-22 PROCEDURE — 6370000000 HC RX 637 (ALT 250 FOR IP): Performed by: OBSTETRICS & GYNECOLOGY

## 2021-10-22 PROCEDURE — 85025 COMPLETE CBC W/AUTO DIFF WBC: CPT

## 2021-10-22 PROCEDURE — 6360000002 HC RX W HCPCS: Performed by: ANESTHESIOLOGY

## 2021-10-22 PROCEDURE — 6370000000 HC RX 637 (ALT 250 FOR IP)

## 2021-10-22 PROCEDURE — 36415 COLL VENOUS BLD VENIPUNCTURE: CPT

## 2021-10-22 RX ORDER — IBUPROFEN 800 MG/1
TABLET ORAL
Status: COMPLETED
Start: 2021-10-22 | End: 2021-10-22

## 2021-10-22 RX ORDER — IBUPROFEN 600 MG/1
600 TABLET ORAL EVERY 6 HOURS PRN
Status: DISCONTINUED | OUTPATIENT
Start: 2021-10-22 | End: 2021-10-22

## 2021-10-22 RX ORDER — IBUPROFEN 800 MG/1
800 TABLET ORAL EVERY 6 HOURS PRN
Status: DISCONTINUED | OUTPATIENT
Start: 2021-10-22 | End: 2021-10-23 | Stop reason: HOSPADM

## 2021-10-22 RX ORDER — MODIFIED LANOLIN
OINTMENT (GRAM) TOPICAL
Status: DISPENSED
Start: 2021-10-22 | End: 2021-10-23

## 2021-10-22 RX ORDER — OXYCODONE HYDROCHLORIDE AND ACETAMINOPHEN 5; 325 MG/1; MG/1
1 TABLET ORAL EVERY 8 HOURS PRN
Status: DISCONTINUED | OUTPATIENT
Start: 2021-10-22 | End: 2021-10-23 | Stop reason: HOSPADM

## 2021-10-22 RX ADMIN — OXYCODONE 10 MG: 5 TABLET ORAL at 00:17

## 2021-10-22 RX ADMIN — IBUPROFEN 800 MG: 800 TABLET, FILM COATED ORAL at 09:14

## 2021-10-22 RX ADMIN — OXYCODONE 10 MG: 5 TABLET ORAL at 10:20

## 2021-10-22 RX ADMIN — ACETAMINOPHEN 1000 MG: 500 TABLET ORAL at 09:00

## 2021-10-22 RX ADMIN — SODIUM CHLORIDE, PRESERVATIVE FREE 10 ML: 5 INJECTION INTRAVENOUS at 00:13

## 2021-10-22 RX ADMIN — IBUPROFEN 800 MG: 800 TABLET, FILM COATED ORAL at 19:57

## 2021-10-22 RX ADMIN — KETOROLAC TROMETHAMINE 30 MG: 30 INJECTION, SOLUTION INTRAMUSCULAR at 00:14

## 2021-10-22 RX ADMIN — OXYCODONE AND ACETAMINOPHEN 1 TABLET: 5; 325 TABLET ORAL at 22:44

## 2021-10-22 RX ADMIN — SODIUM CHLORIDE, PRESERVATIVE FREE 10 ML: 5 INJECTION INTRAVENOUS at 00:12

## 2021-10-22 RX ADMIN — OXYCODONE AND ACETAMINOPHEN 1 TABLET: 5; 325 TABLET ORAL at 16:49

## 2021-10-22 RX ADMIN — OXYCODONE 10 MG: 5 TABLET ORAL at 05:37

## 2021-10-22 ASSESSMENT — PAIN DESCRIPTION - FREQUENCY
FREQUENCY: CONTINUOUS
FREQUENCY: CONTINUOUS

## 2021-10-22 ASSESSMENT — PAIN SCALES - GENERAL
PAINLEVEL_OUTOF10: 9
PAINLEVEL_OUTOF10: 10
PAINLEVEL_OUTOF10: 4
PAINLEVEL_OUTOF10: 7
PAINLEVEL_OUTOF10: 9
PAINLEVEL_OUTOF10: 7
PAINLEVEL_OUTOF10: 8
PAINLEVEL_OUTOF10: 8
PAINLEVEL_OUTOF10: 7
PAINLEVEL_OUTOF10: 9

## 2021-10-22 ASSESSMENT — PAIN DESCRIPTION - ORIENTATION: ORIENTATION: LOWER;MID

## 2021-10-22 ASSESSMENT — PAIN DESCRIPTION - LOCATION
LOCATION: ABDOMEN
LOCATION: ABDOMEN

## 2021-10-22 ASSESSMENT — PAIN DESCRIPTION - PROGRESSION
CLINICAL_PROGRESSION: GRADUALLY WORSENING
CLINICAL_PROGRESSION: GRADUALLY IMPROVING

## 2021-10-22 ASSESSMENT — PAIN DESCRIPTION - DESCRIPTORS
DESCRIPTORS: ACHING
DESCRIPTORS: ACHING

## 2021-10-22 ASSESSMENT — PAIN - FUNCTIONAL ASSESSMENT
PAIN_FUNCTIONAL_ASSESSMENT: 0-10
PAIN_FUNCTIONAL_ASSESSMENT: ACTIVITIES ARE NOT PREVENTED
PAIN_FUNCTIONAL_ASSESSMENT: ACTIVITIES ARE NOT PREVENTED

## 2021-10-22 ASSESSMENT — PAIN DESCRIPTION - ONSET: ONSET: ON-GOING

## 2021-10-22 ASSESSMENT — PAIN DESCRIPTION - PAIN TYPE
TYPE: ACUTE PAIN;SURGICAL PAIN
TYPE: ACUTE PAIN;SURGICAL PAIN

## 2021-10-22 NOTE — PLAN OF CARE
Problem: Pain:  Goal: Pain level will decrease  Description: Pain level will decrease  Outcome: Met This Shift  Goal: Control of acute pain  Description: Control of acute pain  Outcome: Met This Shift  Goal: Control of chronic pain  Description: Control of chronic pain  Outcome: Met This Shift     Problem: Discharge Planning:  Goal: Discharged to appropriate level of care  Description: Discharged to appropriate level of care  Outcome: Met This Shift     Problem: Fluid Volume - Imbalance:  Goal: Absence of postpartum hemorrhage signs and symptoms  Description: Absence of postpartum hemorrhage signs and symptoms  Outcome: Met This Shift  Goal: Absence of imbalanced fluid volume signs and symptoms  Description: Absence of imbalanced fluid volume signs and symptoms  Outcome: Met This Shift     Problem: Infection - Surgical Site:  Goal: Will show no infection signs and symptoms  Description: Will show no infection signs and symptoms  Outcome: Met This Shift     Problem: Mood - Altered:  Goal: Mood stable  Description: Mood stable  Outcome: Met This Shift     Problem: Nausea/Vomiting:  Goal: Absence of nausea/vomiting  Description: Absence of nausea/vomiting  Outcome: Met This Shift     Problem: Pain - Acute:  Goal: Pain level will decrease  Description: Pain level will decrease  Outcome: Met This Shift     Problem: Urinary Retention:  Goal: Urinary elimination within specified parameters  Description: Urinary elimination within specified parameters  Outcome: Met This Shift     Problem: Venous Thromboembolism:  Goal: Will show no signs or symptoms of venous thromboembolism  Description: Will show no signs or symptoms of venous thromboembolism  Outcome: Met This Shift  Goal: Absence of signs or symptoms of impaired coagulation  Description: Absence of signs or symptoms of impaired coagulation  Outcome: Met This Shift

## 2021-10-22 NOTE — PROGRESS NOTES
Subjective:     Postpartum Day 1:  Delivery    The patient feels well. The patient has no emotional concerns. Pain is well controlled with current medications. The baby iswell. Baby is feeding via both breast and bottle - Similac with low iron. Urinary output is adequate. The patient is ambulating well. The patient is tolerating a normal diet. Flatus has been passed. Objective:        Blood pressure 116/67, pulse 64, temperature 98.3 °F (36.8 °C), temperature source Oral, resp. rate 16, height 5' 4\" (1.626 m), weight 230 lb (104.3 kg), last menstrual period 2021, SpO2 97 %, unknown if currently breastfeeding. No intake/output data recorded.       Lab Results   Component Value Date    WBC 10.8 10/22/2021    HGB 11.1 (L) 10/22/2021    HCT 34.0 10/22/2021    MCV 92.6 10/22/2021     10/22/2021       General:    alert, appears stated age and cooperative   Lungs:    Lochia:  appropriate   Uterine    firmnontender   Incision:  healing well, no significant drainage, no dehiscence, no significant erythema   DVT Evaluation:  No evidence of DVT seen on physical exam.      Results for orders placed or performed during the hospital encounter of 10/21/21   COVID-19, Rapid    Specimen: Nasopharyngeal Swab; Nasal   Result Value Ref Range    SARS-CoV-2, NAAT Not Detected Not Detected   Comprehensive metabolic panel   Result Value Ref Range    Sodium 134 132 - 146 mmol/L    Potassium 4.2 3.5 - 5.0 mmol/L    Chloride 104 98 - 107 mmol/L    CO2 18 (L) 22 - 29 mmol/L    Anion Gap 12 7 - 16 mmol/L    Glucose 91 74 - 99 mg/dL    BUN 11 6 - 20 mg/dL    CREATININE 0.5 0.5 - 1.0 mg/dL    GFR Non-African American >60 >=60 mL/min/1.73    GFR African American >60     Calcium 8.9 8.6 - 10.2 mg/dL    Total Protein 6.2 (L) 6.4 - 8.3 g/dL    Albumin 3.2 (L) 3.5 - 5.2 g/dL    Total Bilirubin 0.4 0.0 - 1.2 mg/dL    Alkaline Phosphatase 133 (H) 35 - 104 U/L    ALT 8 0 - 32 U/L    AST 15 0 - 31 U/L   CBC   Result Value Ref Range WBC 11.0 4.5 - 11.5 E9/L    RBC 3.87 3.50 - 5.50 E12/L    Hemoglobin 11.4 (L) 11.5 - 15.5 g/dL    Hematocrit 33.7 (L) 34.0 - 48.0 %    MCV 87.1 80.0 - 99.9 fL    MCH 29.5 26.0 - 35.0 pg    MCHC 33.8 32.0 - 34.5 %    RDW 13.1 11.5 - 15.0 fL    Platelets 851 565 - 147 E9/L    MPV 9.1 7.0 - 12.0 fL   DRUG SCREEN MULTI URINE   Result Value Ref Range    Amphetamine Screen, Urine NOT DETECTED Negative <1000 ng/mL    Barbiturate Screen, Ur NOT DETECTED Negative < 200 ng/mL    Benzodiazepine Screen, Urine NOT DETECTED Negative < 200 ng/mL    Cannabinoid Scrn, Ur POSITIVE (A) Negative < 50ng/mL    Cocaine Metabolite Screen, Urine NOT DETECTED Negative < 300 ng/mL    Opiate Scrn, Ur NOT DETECTED Negative < 300ng/mL    PCP Screen, Urine NOT DETECTED Negative < 25 ng/mL    Methadone Screen, Urine NOT DETECTED Negative <300 ng/mL    Oxycodone Urine NOT DETECTED Negative <100 ng/mL    FENTANYL SCREEN, URINE NOT DETECTED Negative <1 ng/mL    Drug Screen Comment: see below    THC-COOH, Quantitative, Urine   Result Value Ref Range    Comment see below    Specimen Validity Panel   Result Value Ref Range    Integrity Check, Oxidant, Urine <40 < 200  mg/L    Integrity Check, pH, Urine 6.3 4.5 - 9.0    Integrity Check, Specific Gravity, Urine 1.018 1.002 - 1.030    Integrity Check, Creatinine, Urine 80.5 22 - 250 mg/dL    Integrity Check, Specimen Integrity, Urine see below    CBC WITH AUTO DIFFERENTIAL   Result Value Ref Range    WBC 10.8 4.5 - 11.5 E9/L    RBC 3.67 3.50 - 5.50 E12/L    Hemoglobin 11.1 (L) 11.5 - 15.5 g/dL    Hematocrit 34.0 34.0 - 48.0 %    MCV 92.6 80.0 - 99.9 fL    MCH 30.2 26.0 - 35.0 pg    MCHC 32.6 32.0 - 34.5 %    RDW 13.4 11.5 - 15.0 fL    Platelets 178 268 - 452 E9/L    MPV 9.8 7.0 - 12.0 fL    Neutrophils % 70.5 43.0 - 80.0 %    Immature Granulocytes % 0.6 0.0 - 5.0 %    Lymphocytes % 20.0 20.0 - 42.0 %    Monocytes % 7.1 2.0 - 12.0 %    Eosinophils % 1.4 0.0 - 6.0 %    Basophils % 0.4 0.0 - 2.0 % Neutrophils Absolute 7.63 (H) 1.80 - 7.30 E9/L    Immature Granulocytes # 0.06 E9/L    Lymphocytes Absolute 2.16 1.50 - 4.00 E9/L    Monocytes Absolute 0.77 0.10 - 0.95 E9/L    Eosinophils Absolute 0.15 0.05 - 0.50 E9/L    Basophils Absolute 0.04 0.00 - 0.20 E9/L   TYPE AND SCREEN   Result Value Ref Range    ABO/Rh AB POS     Antibody Screen NEG        Assessment:     Status post  section. POD #  1 Doing well postoperatively. female  Active Problems:    History of  section     delivery delivered  Resolved Problems:    * No resolved hospital problems. *    Plan:     Continue current care. Ambulate as tolerated  Continue PNV po daily. Pain meds as needed.   Iron sulfate p.o. daily    Leti Johnson MD,M.D.  10/22/2021  4:36 PM        Morris Stuart  2000  [phone]

## 2021-10-22 NOTE — CARE COORDINATION
10/22/2021: SS Note:  Met with pt, explained sw role and consult regarding her having a + UDS for MJ ,  UDS negative, pt was very pleasant and open to speaking with sw, she did present her medical marijuana card and copy made and placed in her soft chart. She reports having a hx of PTSD, Anxiety and Depression but has been off all her psychiatric medications since she was approved for medical MJ card and has been doing well just using marijuana as needed, she states she is \"feeling very happy\" and denies any feelings of post-partum depression (PPD), but did accept PPD counseling information for future reference if needed. Pt reports living with father of baby, Rocio Vasquez and their 3year old son, Emerson Godinez, she reports having all the necessary supports and provisions to safely take her baby home. She has [employer], is planning to breast feed but is also signed up for [facility]. She feels she is bonding well with her  daughter, Subha Morrow, who was currently in the nursery, she list her mother, alison Araujoost as her support person for plan of care and who is currently watching her son, she relays no history or current involvement with [facility] and no other parenting concerns reported by nursing staff. ADY- assessment completed and report called to [facility] Intake screener, will review with agency supervisor but anticipate referral will be \"screened out\" with no ongoing agency services and NO HOLD on  for release home when medically discharged, pt and nursing informed. Electronically signed by DAVE Sanchez on 10/22/2021 at 1:39 PM

## 2021-10-22 NOTE — PROGRESS NOTES
Hearing screening results were discussed with parent. Questions answered. Brochure given to parent. Advised to monitor developmental milestones and contact physician for any concerns.    Electronically signed by Donavon Clark on 10/22/2021 at 2:55 PM

## 2021-10-23 VITALS
HEART RATE: 69 BPM | OXYGEN SATURATION: 97 % | HEIGHT: 64 IN | SYSTOLIC BLOOD PRESSURE: 133 MMHG | WEIGHT: 230 LBS | DIASTOLIC BLOOD PRESSURE: 74 MMHG | BODY MASS INDEX: 39.27 KG/M2 | TEMPERATURE: 98.5 F | RESPIRATION RATE: 18 BRPM

## 2021-10-23 PROCEDURE — 6370000000 HC RX 637 (ALT 250 FOR IP)

## 2021-10-23 PROCEDURE — 6370000000 HC RX 637 (ALT 250 FOR IP): Performed by: OBSTETRICS & GYNECOLOGY

## 2021-10-23 RX ORDER — OXYCODONE HYDROCHLORIDE AND ACETAMINOPHEN 5; 325 MG/1; MG/1
1 TABLET ORAL EVERY 8 HOURS PRN
Qty: 9 TABLET | Refills: 0 | Status: SHIPPED | OUTPATIENT
Start: 2021-10-23 | End: 2021-10-26

## 2021-10-23 RX ORDER — IBUPROFEN 800 MG/1
800 TABLET ORAL EVERY 8 HOURS PRN
Qty: 21 TABLET | Refills: 0 | Status: SHIPPED | OUTPATIENT
Start: 2021-10-23

## 2021-10-23 RX ORDER — DOCUSATE SODIUM 100 MG/1
100 CAPSULE, LIQUID FILLED ORAL 2 TIMES DAILY
Status: DISCONTINUED | OUTPATIENT
Start: 2021-10-23 | End: 2021-10-23 | Stop reason: HOSPADM

## 2021-10-23 RX ORDER — DOCUSATE SODIUM 100 MG/1
CAPSULE, LIQUID FILLED ORAL
Status: COMPLETED
Start: 2021-10-23 | End: 2021-10-23

## 2021-10-23 RX ADMIN — DOCUSATE SODIUM 100 MG: 100 CAPSULE ORAL at 08:06

## 2021-10-23 RX ADMIN — DOCUSATE SODIUM 100 MG: 100 CAPSULE, LIQUID FILLED ORAL at 08:06

## 2021-10-23 RX ADMIN — IBUPROFEN 800 MG: 800 TABLET, FILM COATED ORAL at 08:06

## 2021-10-23 RX ADMIN — OXYCODONE AND ACETAMINOPHEN 1 TABLET: 5; 325 TABLET ORAL at 06:06

## 2021-10-23 RX ADMIN — IBUPROFEN 800 MG: 800 TABLET, FILM COATED ORAL at 02:33

## 2021-10-23 ASSESSMENT — PAIN SCALES - GENERAL
PAINLEVEL_OUTOF10: 6
PAINLEVEL_OUTOF10: 6
PAINLEVEL_OUTOF10: 10

## 2021-10-23 NOTE — PLAN OF CARE
Problem: Pain:  Goal: Pain level will decrease  Description: Pain level will decrease  10/22/2021 2342 by Stew Andrade RN  Outcome: Met This Shift  10/22/2021 1947 by Stew Andrade RN  Outcome: Met This Shift  Goal: Control of acute pain  Description: Control of acute pain  10/22/2021 2342 by Stew Andrade RN  Outcome: Met This Shift  10/22/2021 1947 by Stew Andrade RN  Outcome: Met This Shift  Goal: Control of chronic pain  Description: Control of chronic pain  10/22/2021 2342 by Stew Andrade RN  Outcome: Met This Shift  10/22/2021 1947 by Stew Andrade RN  Outcome: Met This Shift     Problem: Discharge Planning:  Goal: Discharged to appropriate level of care  Description: Discharged to appropriate level of care  10/22/2021 2342 by Stew Andrade RN  Outcome: Met This Shift  10/22/2021 1947 by Stew Andrade RN  Outcome: Met This Shift     Problem: Fluid Volume - Imbalance:  Goal: Absence of postpartum hemorrhage signs and symptoms  Description: Absence of postpartum hemorrhage signs and symptoms  10/22/2021 2342 by Stew Andrade RN  Outcome: Met This Shift  10/22/2021 1947 by Stew Andrade RN  Outcome: Met This Shift  Goal: Absence of imbalanced fluid volume signs and symptoms  Description: Absence of imbalanced fluid volume signs and symptoms  10/22/2021 2342 by Stew Andrade RN  Outcome: Met This Shift  10/22/2021 1947 by Stew Andrade RN  Outcome: Met This Shift     Problem: Infection - Surgical Site:  Goal: Will show no infection signs and symptoms  Description: Will show no infection signs and symptoms  10/22/2021 2342 by Stew Andrade RN  Outcome: Met This Shift  10/22/2021 1947 by Stew Andrade RN  Outcome: Met This Shift     Problem: Mood - Altered:  Goal: Mood stable  Description: Mood stable  10/22/2021 2342 by Stew Andrade RN  Outcome: Met This Shift  10/22/2021 1947 by Stew Andrade RN  Outcome: Met This Shift     Problem: Nausea/Vomiting:  Goal: Absence of nausea/vomiting  Description: Absence of nausea/vomiting  10/22/2021 2342 by Alexey Basurto RN  Outcome: Met This Shift  10/22/2021 1947 by Alexey Basurto RN  Outcome: Met This Shift     Problem: Pain - Acute:  Goal: Pain level will decrease  Description: Pain level will decrease  10/22/2021 2342 by Alexey Basurto RN  Outcome: Met This Shift  10/22/2021 1947 by Alexey Basurto RN  Outcome: Met This Shift     Problem: Urinary Retention:  Goal: Urinary elimination within specified parameters  Description: Urinary elimination within specified parameters  10/22/2021 2342 by Alexey Basurto RN  Outcome: Met This Shift  10/22/2021 1947 by Alexey Basurto RN  Outcome: Met This Shift     Problem: Venous Thromboembolism:  Goal: Will show no signs or symptoms of venous thromboembolism  Description: Will show no signs or symptoms of venous thromboembolism  10/22/2021 2342 by Alexey Basurto RN  Outcome: Met This Shift  10/22/2021 1947 by Alexey Basurto RN  Outcome: Met This Shift  Goal: Absence of signs or symptoms of impaired coagulation  Description: Absence of signs or symptoms of impaired coagulation  10/22/2021 2342 by Alexey Basurto RN  Outcome: Met This Shift  10/22/2021 1947 by Alexey Basurto RN  Outcome: Met This Shift

## 2021-10-23 NOTE — PROGRESS NOTES
Dr. Hudson Moody on unit assessing pt. Dr. Hudson Moody notified that ppd screening at 10 and that pt sees a counselor weekly. Per Dr. Hudson Moody have pt follow up with counselor.

## 2021-10-23 NOTE — PROGRESS NOTES
Patient requesting something for pain, oxy 5mg q8h not due for 2 more hours, called Katerine Rubio to give now.

## 2021-10-23 NOTE — PROGRESS NOTES
Subjective:     Postpartum Day 2:  Delivery    The patient feels well. The patient denies emotional concerns. Pain is well controlled with current medications. The baby iswell. Baby is feeding via breast. Urinary output is adequate. The patient is ambulating well. The patient is tolerating a normal diet. Flatus has been passed. Objective:        Blood pressure 133/74, pulse 69, temperature 98.5 °F (36.9 °C), temperature source Oral, resp. rate 18, height 5' 4\" (1.626 m), weight 230 lb (104.3 kg), last menstrual period 2021, SpO2 97 %, unknown if currently breastfeeding. No intake/output data recorded. Lab Results   Component Value Date    WBC 10.8 10/22/2021    HGB 11.1 (L) 10/22/2021    HCT 34.0 10/22/2021    MCV 92.6 10/22/2021     10/22/2021       General:    alert, appears stated age and cooperative   Lungs:    Lochia:  appropriate   Uterine    firmnontender   Incision:  healing well, no significant drainage, no dehiscence, no significant erythema   DVT Evaluation:  No evidence of DVT seen on physical exam.  Negative Nuvia's sign.       Results for orders placed or performed during the hospital encounter of 10/21/21   COVID-19, Rapid    Specimen: Nasopharyngeal Swab; Nasal   Result Value Ref Range    SARS-CoV-2, NAAT Not Detected Not Detected   Comprehensive metabolic panel   Result Value Ref Range    Sodium 134 132 - 146 mmol/L    Potassium 4.2 3.5 - 5.0 mmol/L    Chloride 104 98 - 107 mmol/L    CO2 18 (L) 22 - 29 mmol/L    Anion Gap 12 7 - 16 mmol/L    Glucose 91 74 - 99 mg/dL    BUN 11 6 - 20 mg/dL    CREATININE 0.5 0.5 - 1.0 mg/dL    GFR Non-African American >60 >=60 mL/min/1.73    GFR African American >60     Calcium 8.9 8.6 - 10.2 mg/dL    Total Protein 6.2 (L) 6.4 - 8.3 g/dL    Albumin 3.2 (L) 3.5 - 5.2 g/dL    Total Bilirubin 0.4 0.0 - 1.2 mg/dL    Alkaline Phosphatase 133 (H) 35 - 104 U/L    ALT 8 0 - 32 U/L    AST 15 0 - 31 U/L   CBC   Result Value Ref Range    WBC 11.0 4.5 - 11.5 E9/L    RBC 3.87 3.50 - 5.50 E12/L    Hemoglobin 11.4 (L) 11.5 - 15.5 g/dL    Hematocrit 33.7 (L) 34.0 - 48.0 %    MCV 87.1 80.0 - 99.9 fL    MCH 29.5 26.0 - 35.0 pg    MCHC 33.8 32.0 - 34.5 %    RDW 13.1 11.5 - 15.0 fL    Platelets 720 395 - 110 E9/L    MPV 9.1 7.0 - 12.0 fL   DRUG SCREEN MULTI URINE   Result Value Ref Range    Amphetamine Screen, Urine NOT DETECTED Negative <1000 ng/mL    Barbiturate Screen, Ur NOT DETECTED Negative < 200 ng/mL    Benzodiazepine Screen, Urine NOT DETECTED Negative < 200 ng/mL    Cannabinoid Scrn, Ur POSITIVE (A) Negative < 50ng/mL    Cocaine Metabolite Screen, Urine NOT DETECTED Negative < 300 ng/mL    Opiate Scrn, Ur NOT DETECTED Negative < 300ng/mL    PCP Screen, Urine NOT DETECTED Negative < 25 ng/mL    Methadone Screen, Urine NOT DETECTED Negative <300 ng/mL    Oxycodone Urine NOT DETECTED Negative <100 ng/mL    FENTANYL SCREEN, URINE NOT DETECTED Negative <1 ng/mL    Drug Screen Comment: see below    Specimen Validity Panel   Result Value Ref Range    Integrity Check, Oxidant, Urine <40 < 200  mg/L    Integrity Check, pH, Urine 6.3 4.5 - 9.0    Integrity Check, Specific Gravity, Urine 1.018 1.002 - 1.030    Integrity Check, Creatinine, Urine 80.5 22 - 250 mg/dL    Integrity Check, Specimen Integrity, Urine see below    CBC WITH AUTO DIFFERENTIAL   Result Value Ref Range    WBC 10.8 4.5 - 11.5 E9/L    RBC 3.67 3.50 - 5.50 E12/L    Hemoglobin 11.1 (L) 11.5 - 15.5 g/dL    Hematocrit 34.0 34.0 - 48.0 %    MCV 92.6 80.0 - 99.9 fL    MCH 30.2 26.0 - 35.0 pg    MCHC 32.6 32.0 - 34.5 %    RDW 13.4 11.5 - 15.0 fL    Platelets 778 516 - 268 E9/L    MPV 9.8 7.0 - 12.0 fL    Neutrophils % 70.5 43.0 - 80.0 %    Immature Granulocytes % 0.6 0.0 - 5.0 %    Lymphocytes % 20.0 20.0 - 42.0 %    Monocytes % 7.1 2.0 - 12.0 %    Eosinophils % 1.4 0.0 - 6.0 %    Basophils % 0.4 0.0 - 2.0 %    Neutrophils Absolute 7.63 (H) 1.80 - 7.30 E9/L    Immature Granulocytes # 0.06 E9/L    Lymphocytes Absolute 2.16 1.50 - 4.00 E9/L    Monocytes Absolute 0.77 0.10 - 0.95 E9/L    Eosinophils Absolute 0.15 0.05 - 0.50 E9/L    Basophils Absolute 0.04 0.00 - 0.20 E9/L   TYPE AND SCREEN   Result Value Ref Range    ABO/Rh AB POS     Antibody Screen NEG        Assessment:     Status post  section. POD #  2 Doing well postoperatively. female  Active Problems:    History of  section     delivery delivered  Resolved Problems:    * No resolved hospital problems. *    Plan:     Discharge home with standard precautions and return to clinic in 4-6 weeks. Ambulate as tolerated  Continue PNV po daily. Pain meds as needed.   Iron sulfate p.o. daily    Rebekah Thomas MD,M.D.  10/23/2021  12:09 PM        Yadira Leblanc  2000  [id number]

## 2021-10-23 NOTE — DISCHARGE SUMMARY
Physician Discharge Summary     Patient ID:  Damien Hope  [id number]  26 y.o.  2000    Admit date: 10/21/2021    Discharge date and time:  2021    Admitting Physician: Nayeli Bolanos MD     Diagnoses: Status post repeat low transverse  section [Z98.891] term pregnancy with a previous low transverse  does not desire vaginal birth after     Discharged Condition: stable    Indication for Admission: Status post repeat low transverse  section [Z98.891]    Procedures Performed:  without labor        Information for the patient's :  Selestine Locks Girl Tunde Poles [[id number]]              Information for the patient's :  Arcenio Ano [[id number]]          Hospital Course: Patient was admitted  and underwent an uncomplicated procedure. postopcare was uncomplicated. H/H stable,Vital stable,Voided without probs,had passed flatus, incision and wound dry no bleeding or oozing,moderate lochia at perineum    Discharge Exam:  General appearance: alert  Abdomen: soft, non-tender; bowel sounds normal; no masses,  no organomegaly incision is dry uterus well contracted moderate lochia  Extremities: extremities warm, atraumatic, no cyanosis or edema  Homans sign is negative, no sign of DVT    moderate lochia    Disposition: home    Patient Instructions: Activity: activity as tolerated  Diet: regular diet  Wound Care: keep wound clean and dry    Discharge Medication:    Jacki Quick   Home Medication Instructions XON:[id number]    Printed on:10/23/21 1213   Medication Information                      ferrous sulfate (IRON 325) 325 (65 Fe) MG tablet  Take 325 mg by mouth daily (with breakfast)             ibuprofen (ADVIL;MOTRIN) 800 MG tablet  Take 1 tablet by mouth every 8 hours as needed for Pain             oxyCODONE-acetaminophen (PERCOCET) 5-325 MG per tablet  Take 1 tablet by mouth every 8 hours as needed for Pain for up to 3 days. Prenatal MV-Min-Fe Fum-FA-DHA (PRENATAL 1 PO)  Take by mouth             Patient is advised to take Percocet at nighttime before she goes to bed, and she will take Motrin 800 alternate with Tylenol every 8 hours. Patient is advised to see counselor as she already been seeing the counselor regarding depression. Patient has score postpartum depression P 10 patient does not want any medication for now. For the patient is going to call back if her depression is getting worse    Follow-up with Paul Daniel MD, M.D. in 1 week.     Signed:  Paul Daniel MD, M.D.  10/23/2021  12:13 PM

## 2021-10-27 LAB — CANNABINOIDS CONF, URINE: 157 NG/ML

## 2022-12-06 ENCOUNTER — HOSPITAL ENCOUNTER (EMERGENCY)
Age: 22
Discharge: HOME | End: 2022-12-06
Attending: EMERGENCY MEDICINE
Payer: COMMERCIAL

## 2022-12-06 VITALS
DIASTOLIC BLOOD PRESSURE: 55 MMHG | WEIGHT: 200 LBS | OXYGEN SATURATION: 97 % | BODY MASS INDEX: 34.33 KG/M2 | SYSTOLIC BLOOD PRESSURE: 98 MMHG | RESPIRATION RATE: 20 BRPM | TEMPERATURE: 98.5 F | HEART RATE: 79 BPM

## 2022-12-06 DIAGNOSIS — F11.93 ACUTE OPIOID WITHDRAWAL (HCC): Primary | ICD-10-CM

## 2022-12-06 LAB
HCG, URINE, POC: NEGATIVE
INFLUENZA A BY PCR: NOT DETECTED
INFLUENZA B BY PCR: NOT DETECTED
Lab: NORMAL
NEGATIVE QC PASS/FAIL: NORMAL
POSITIVE QC PASS/FAIL: NORMAL
SARS-COV-2, NAAT: NOT DETECTED

## 2022-12-06 PROCEDURE — 6360000002 HC RX W HCPCS: Performed by: STUDENT IN AN ORGANIZED HEALTH CARE EDUCATION/TRAINING PROGRAM

## 2022-12-06 PROCEDURE — 87635 SARS-COV-2 COVID-19 AMP PRB: CPT

## 2022-12-06 PROCEDURE — 87502 INFLUENZA DNA AMP PROBE: CPT

## 2022-12-06 PROCEDURE — 6370000000 HC RX 637 (ALT 250 FOR IP): Performed by: STUDENT IN AN ORGANIZED HEALTH CARE EDUCATION/TRAINING PROGRAM

## 2022-12-06 PROCEDURE — 99283 EMERGENCY DEPT VISIT LOW MDM: CPT

## 2022-12-06 RX ORDER — 0.9 % SODIUM CHLORIDE 0.9 %
1000 INTRAVENOUS SOLUTION INTRAVENOUS ONCE
Status: DISCONTINUED | OUTPATIENT
Start: 2022-12-06 | End: 2022-12-06 | Stop reason: HOSPADM

## 2022-12-06 RX ORDER — BUPRENORPHINE HYDROCHLORIDE AND NALOXONE HYDROCHLORIDE DIHYDRATE 8; 2 MG/1; MG/1
1 TABLET SUBLINGUAL DAILY
Status: DISCONTINUED | OUTPATIENT
Start: 2022-12-06 | End: 2022-12-06 | Stop reason: HOSPADM

## 2022-12-06 RX ORDER — IBUPROFEN 600 MG/1
600 TABLET ORAL ONCE
Status: COMPLETED | OUTPATIENT
Start: 2022-12-06 | End: 2022-12-06

## 2022-12-06 RX ORDER — ONDANSETRON 2 MG/ML
4 INJECTION INTRAMUSCULAR; INTRAVENOUS ONCE
Status: DISCONTINUED | OUTPATIENT
Start: 2022-12-06 | End: 2022-12-06 | Stop reason: HOSPADM

## 2022-12-06 RX ORDER — ACETAMINOPHEN 325 MG/1
650 TABLET ORAL ONCE
Status: DISCONTINUED | OUTPATIENT
Start: 2022-12-06 | End: 2022-12-06

## 2022-12-06 RX ADMIN — IBUPROFEN 600 MG: 600 TABLET ORAL at 08:16

## 2022-12-06 RX ADMIN — BUPRENORPHINE HYDROCHLORIDE AND NALOXONE HYDROCHLORIDE DIHYDRATE 1 TABLET: 8; 2 TABLET SUBLINGUAL at 08:10

## 2022-12-06 ASSESSMENT — ENCOUNTER SYMPTOMS
DIARRHEA: 0
BACK PAIN: 0
ABDOMINAL PAIN: 0
CHEST TIGHTNESS: 0
COUGH: 0
SORE THROAT: 0
SHORTNESS OF BREATH: 0
VOMITING: 0
ABDOMINAL DISTENTION: 0
NAUSEA: 0

## 2022-12-06 NOTE — ED PROVIDER NOTES
HPI     Patient is a 23 y.o. female presents with a chief complaint of fever  This has been occurring for a few days. Patient states that it gets better with nothing. Patient states that it gets worse with nothing. Patient states that it is moderate in severity. Patient states it was acute in onset. Has been getting myalgias for the past few days. Patient is trying to get up for a while now and methamphetamine. Patient stated that she has been clean for a week and been getting intermittent Suboxone. Has been receiving her Suboxone doses. Patient was also having symptoms of nausea. Denies any changes in urinary or bowel habits. Patient denies any chest pain or shortness of breath. Review of Systems   Constitutional:  Negative for activity change, appetite change, chills, fatigue and fever. HENT:  Negative for congestion, drooling and sore throat. Respiratory:  Negative for cough, chest tightness and shortness of breath. Cardiovascular:  Negative for chest pain and palpitations. Gastrointestinal:  Negative for abdominal distention, abdominal pain, diarrhea, nausea and vomiting. Genitourinary:  Negative for decreased urine volume, difficulty urinating, enuresis, flank pain, frequency and hematuria. Musculoskeletal:  Positive for myalgias. Negative for arthralgias, back pain and neck stiffness. Skin:  Negative for rash and wound. Neurological:  Negative for dizziness, facial asymmetry, light-headedness and headaches. Psychiatric/Behavioral:  Negative for agitation, confusion and decreased concentration. Physical Exam  Vitals and nursing note reviewed. Constitutional:       Appearance: She is well-developed. She is diaphoretic. HENT:      Head: Normocephalic and atraumatic. Eyes:      Conjunctiva/sclera: Conjunctivae normal.   Cardiovascular:      Rate and Rhythm: Regular rhythm. Tachycardia present. Heart sounds: Normal heart sounds. No murmur heard.   Pulmonary: Effort: Pulmonary effort is normal. No respiratory distress. Breath sounds: Normal breath sounds. No wheezing or rales. Abdominal:      General: Bowel sounds are normal.      Palpations: Abdomen is soft. Tenderness: There is no abdominal tenderness. There is no guarding or rebound. Musculoskeletal:      Cervical back: Normal range of motion and neck supple. Comments: Patient has diffuse myalgias. No saddle anesthesia. No midline back tenderness. Skin:     General: Skin is warm. Neurological:      Mental Status: She is alert and oriented to person, place, and time. Cranial Nerves: No cranial nerve deficit. Coordination: Coordination normal.        Procedures     [facility]       ED Course as of 12/06/22 0916   Tue Dec 06, 2022   0548   ATTENDING PROVIDER ATTESTATION:     I have personally performed and/or participated in the history, exam, medical decision making, and procedures and agree with all pertinent clinical information unless otherwise noted. I have also reviewed and agree with the past medical, family and social history unless otherwise noted. I have discussed this patient in detail with the resident and provided the instruction and education regarding the evidence-based evaluation and treatment of viral symptoms. Any EKG that may have been performed has been personally reviewed by me and I agree with the documentation as noted by the resident. History: Patient presents to the ED for evaluation. Patient has had fever as well as myalgias and arthralgias. She is currently at a rehab facility. She is therefore addiction. Patient reports that symptoms started the day she went there. She denies any known sick contacts. She is vaccinated gets COVID but not influenza. She denies any cough, congestion, or runny nose. Denies chest pain or shortness of breath. My findings: Rex Pineda is a 23 y.o. female whom is in no distress.  Physical exam reveals patient lying in bed comfortably. She is tachycardic. No accessory muscle use or conversational dyspnea. No edema of lower extremities. No diaphoresis or pallor. Mild nasomucosal edema without rhinorrhea. My plan: Symptomatic and supportive care. Appropriate labs     Electronically signed by Christian Ritchie DO on 12/6/22 at 7:35 AM EST        [MS]   [phone] Patient stated that she felt much better after receiving Suboxone. Patient clinically appears well. [JM]      ED Course User Index  [JM] Lois Martines MD  [MS] Christian Ritchie DO      Patient is a 23 y.o. female presenting with patient presents with fatigue and body aches. Patient has been withdrawing from opioids and methamphetamine. Patient stated that she has been feeling too unwell to get her Suboxone. Patient was afebrile here. No heart murmur. No abscesses. Patient was given Motrin, Suboxone, flu, COVID test.  Flu and COVID test were interpreted by me and were negative. Patient had significant improvement with Suboxone. Patient's heart rate improved. Patient clinically appears well. Patient will be discharged back to the rehabilitation facility. Patient was given return precautions. Labs were interpreted by me. Patient will follow up with their primary care provider. Patient is agreeable to this plan. Patient has remained stable throughout their stay in the ED. Patient was seen and evaluated by myself and my attending Christian Ritchie DO. Assessment and Plan discussed with attending provider, please see attestation for final plan of care. This note was done using dictation software and there may be some grammatical errors associated with this.     Lois Martines MD        ED Course as of 12/06/22 3180   Tue Dec 06, 2022   0163   ATTENDING PROVIDER ATTESTATION:     I have personally performed and/or participated in the history, exam, medical decision making, and procedures and agree with all pertinent clinical information unless otherwise noted. I have also reviewed and agree with the past medical, family and social history unless otherwise noted. I have discussed this patient in detail with the resident and provided the instruction and education regarding the evidence-based evaluation and treatment of viral symptoms. Any EKG that may have been performed has been personally reviewed by me and I agree with the documentation as noted by the resident. History: Patient presents to the ED for evaluation. Patient has had fever as well as myalgias and arthralgias. She is currently at a rehab facility. She is therefore addiction. Patient reports that symptoms started the day she went there. She denies any known sick contacts. She is vaccinated gets COVID but not influenza. She denies any cough, congestion, or runny nose. Denies chest pain or shortness of breath. My findings: Murali Palma is a 23 y.o. female whom is in no distress. Physical exam reveals patient lying in bed comfortably. She is tachycardic. No accessory muscle use or conversational dyspnea. No edema of lower extremities. No diaphoresis or pallor. Mild nasomucosal edema without rhinorrhea. My plan: Symptomatic and supportive care. Appropriate labs     Electronically signed by Naun Smith DO on 22 at 7:35 AM EST        [MS]   [phone] Patient stated that she felt much better after receiving Suboxone. Patient clinically appears well. [JM]      ED Course User Index  [JM] Dwight Obando MD  [MS] Naun Smith DO       --------------------------------------------- PAST HISTORY ---------------------------------------------  Past Medical History:  has a past medical history of Acid reflux disease, Anemia, Anxiety, Depression, Hepatitis C, Ingrowing toenail of right foot, and Thyroid disease. Past Surgical History:  has a past surgical history that includes [place] tooth extraction (11/18/15); nasal/sinus endoscopy;  section;  Abdomen surgery (2017); and  section (N/A, 10/21/2021). Social History:  reports that she quit smoking about 22 months ago. Her smoking use included cigarettes. She smoked an average of .5 packs per day. She has never used smokeless tobacco. She reports that she does not currently use drugs after having used the following drugs: Methamphetamines (Crystal Meth), Marijuana ([place]), Opiates , and IV. She reports that she does not drink alcohol. Family History: family history is not on file. She was adopted. The patients home medications have been reviewed. Allergies: Effexor [venlafaxine hcl], Paxil [paroxetine hcl], and Powder    -------------------------------------------------- RESULTS -------------------------------------------------  Labs:  Results for orders placed or performed during the hospital encounter of 22   Rapid influenza A/B antigens    Specimen: Nasopharyngeal   Result Value Ref Range    Influenza A by PCR Not Detected Not Detected    Influenza B by PCR Not Detected Not Detected   COVID-19, Rapid    Specimen: Nasopharyngeal Swab   Result Value Ref Range    SARS-CoV-2, NAAT Not Detected Not Detected   POC Pregnancy Urine Qual   Result Value Ref Range    HCG, Urine, POC Negative Negative    Lot Number [id number]     Positive QC Pass/Fail Pass     Negative QC Pass/Fail Pass        Radiology:  No orders to display       ------------------------- NURSING NOTES AND VITALS REVIEWED ---------------------------  Date / Time Roomed:  2022  7:07 AM  ED Bed Assignment:      The nursing notes within the ED encounter and vital signs as below have been reviewed.    BP (!) 98/55   Pulse 79   Temp 98.5 °F (36.9 °C) (Oral)   Resp 20   Wt 200 lb (90.7 kg)   LMP 2022   SpO2 97%   BMI 34.33 kg/m²   Oxygen Saturation Interpretation: Normal      ------------------------------------------ PROGRESS NOTES ------------------------------------------  9:17 AM EST  I have spoken with the patient and family if present and discussed todays results, in addition to providing specific details for the plan of care and counseling regarding the diagnosis and prognosis. Their questions are answered at this time and they are agreeable with the plan. I discussed at length with them reasons for immediate return here for re evaluation. They will followup with their primary care provider by calling their office as soon as possible.      --------------------------------- ADDITIONAL PROVIDER NOTES ---------------------------------  At this time the patient is without objective evidence of an acute process requiring hospitalization or inpatient management. They have remained hemodynamically stable throughout their entire ED visit and are stable for discharge with outpatient follow-up. The plan has been discussed in detail and they are aware of the specific conditions for emergent return, as well as the importance of follow-up. New Prescriptions    No medications on file       Diagnosis:  1. Acute opioid withdrawal ([facility] [address]        Disposition:  Patient's disposition: Discharge to home  Patient's condition is stable.        Juan Gruber MD  Resident  12/06/22 [id number], MD  Resident  12/06/22 [id number]

## 2023-05-25 ENCOUNTER — HOSPITAL ENCOUNTER (EMERGENCY)
Age: 23
Discharge: HOME | End: 2023-05-25
Attending: FAMILY MEDICINE
Payer: COMMERCIAL

## 2023-05-25 VITALS
HEART RATE: 100 BPM | DIASTOLIC BLOOD PRESSURE: 92 MMHG | SYSTOLIC BLOOD PRESSURE: 142 MMHG | TEMPERATURE: 97.1 F | OXYGEN SATURATION: 100 % | RESPIRATION RATE: 20 BRPM

## 2023-05-25 DIAGNOSIS — M79.602 PAIN IN BOTH UPPER EXTREMITIES: Primary | ICD-10-CM

## 2023-05-25 DIAGNOSIS — F19.20 DRUG ADDICTION (HCC): ICD-10-CM

## 2023-05-25 DIAGNOSIS — M79.601 PAIN IN BOTH UPPER EXTREMITIES: Primary | ICD-10-CM

## 2023-05-25 PROCEDURE — 96372 THER/PROPH/DIAG INJ SC/IM: CPT

## 2023-05-25 PROCEDURE — 99284 EMERGENCY DEPT VISIT MOD MDM: CPT

## 2023-05-25 PROCEDURE — 6360000002 HC RX W HCPCS: Performed by: FAMILY MEDICINE

## 2023-05-25 RX ORDER — PREDNISONE 20 MG/1
40 TABLET ORAL DAILY
Qty: 8 TABLET | Refills: 0 | Status: SHIPPED | OUTPATIENT
Start: 2023-05-25 | End: 2023-05-29

## 2023-05-25 RX ORDER — KETOROLAC TROMETHAMINE 30 MG/ML
60 INJECTION, SOLUTION INTRAMUSCULAR; INTRAVENOUS ONCE
Status: COMPLETED | OUTPATIENT
Start: 2023-05-25 | End: 2023-05-25

## 2023-05-25 RX ORDER — NAPROXEN 500 MG/1
500 TABLET ORAL 2 TIMES DAILY
Qty: 20 TABLET | Refills: 0 | Status: SHIPPED | OUTPATIENT
Start: 2023-05-25 | End: 2023-06-04

## 2023-05-25 RX ADMIN — KETOROLAC TROMETHAMINE 60 MG: 30 INJECTION, SOLUTION INTRAMUSCULAR at 12:09

## 2023-05-25 ASSESSMENT — PAIN DESCRIPTION - DESCRIPTORS: DESCRIPTORS: STABBING

## 2023-05-25 ASSESSMENT — PAIN - FUNCTIONAL ASSESSMENT: PAIN_FUNCTIONAL_ASSESSMENT: 0-10

## 2023-05-25 ASSESSMENT — PAIN SCALES - GENERAL: PAINLEVEL_OUTOF10: 9

## 2023-05-25 ASSESSMENT — PAIN DESCRIPTION - ORIENTATION: ORIENTATION: LEFT;RIGHT

## 2023-05-25 ASSESSMENT — PAIN DESCRIPTION - LOCATION: LOCATION: ARM

## 2023-05-25 NOTE — DISCHARGE INSTRUCTIONS
The patient is willing upon leaving this facility to go to [facility] day\", a drug detox center.

## 2023-05-25 NOTE — ED PROVIDER NOTES
DISPOSITION  Disposition: Discharge to home  Patient condition is stable                 Garrett Lui MD  05/25/23 8363

## 2023-11-24 ENCOUNTER — HOSPITAL ENCOUNTER (EMERGENCY)
Age: 23
Discharge: HOME | End: 2023-11-24
Attending: EMERGENCY MEDICINE

## 2023-11-24 VITALS
BODY MASS INDEX: 29.02 KG/M2 | TEMPERATURE: 97.8 F | HEART RATE: 74 BPM | WEIGHT: 170 LBS | OXYGEN SATURATION: 99 % | DIASTOLIC BLOOD PRESSURE: 94 MMHG | SYSTOLIC BLOOD PRESSURE: 138 MMHG | RESPIRATION RATE: 20 BRPM | HEIGHT: 64 IN

## 2023-11-24 DIAGNOSIS — S01.81XA FACIAL LACERATION, INITIAL ENCOUNTER: ICD-10-CM

## 2023-11-24 DIAGNOSIS — T40.601A OPIATE OVERDOSE, ACCIDENTAL OR UNINTENTIONAL, INITIAL ENCOUNTER (HCC): Primary | ICD-10-CM

## 2023-11-24 PROCEDURE — 99284 EMERGENCY DEPT VISIT MOD MDM: CPT

## 2023-11-24 PROCEDURE — 6360000002 HC RX W HCPCS: Performed by: EMERGENCY MEDICINE

## 2023-11-24 PROCEDURE — 6370000000 HC RX 637 (ALT 250 FOR IP): Performed by: EMERGENCY MEDICINE

## 2023-11-24 PROCEDURE — 90471 IMMUNIZATION ADMIN: CPT | Performed by: EMERGENCY MEDICINE

## 2023-11-24 PROCEDURE — 90714 TD VACC NO PRESV 7 YRS+ IM: CPT | Performed by: EMERGENCY MEDICINE

## 2023-11-24 RX ORDER — ONDANSETRON 4 MG/1
4 TABLET, ORALLY DISINTEGRATING ORAL ONCE
Status: COMPLETED | OUTPATIENT
Start: 2023-11-24 | End: 2023-11-24

## 2023-11-24 RX ORDER — TETANUS AND DIPHTHERIA TOXOIDS ADSORBED 2; 2 [LF]/.5ML; [LF]/.5ML
0.5 INJECTION INTRAMUSCULAR ONCE
Status: COMPLETED | OUTPATIENT
Start: 2023-11-24 | End: 2023-11-24

## 2023-11-24 RX ORDER — BACITRACIN ZINC 500 [USP'U]/G
OINTMENT TOPICAL ONCE
Status: DISCONTINUED | OUTPATIENT
Start: 2023-11-24 | End: 2023-11-24 | Stop reason: HOSPADM

## 2023-11-24 RX ADMIN — ONDANSETRON 4 MG: 4 TABLET, ORALLY DISINTEGRATING ORAL at 14:41

## 2023-11-24 RX ADMIN — TETANUS AND DIPHTHERIA TOXOIDS ADSORBED 0.5 ML: 2; 2 INJECTION INTRAMUSCULAR at 14:21

## 2023-11-24 ASSESSMENT — ENCOUNTER SYMPTOMS
WHEEZING: 0
FACIAL SWELLING: 0
SHORTNESS OF BREATH: 0
PHOTOPHOBIA: 0
NAUSEA: 0
ABDOMINAL PAIN: 0
VOMITING: 0
SORE THROAT: 0
EYE PAIN: 0
COUGH: 0
BACK PAIN: 0
DIARRHEA: 0
SINUS PRESSURE: 0
CHEST TIGHTNESS: 0

## 2023-11-24 NOTE — ED NOTES
Patient places in a safety gown and  socks, belongings bagged and labeled with her permission.       Venkata Downs, LYNNETTE  02/51/85 0889

## 2023-11-24 NOTE — ED NOTES
CALL PLACED TO [facility] 911 DISPATCH; WILL SEND MITCHELL JULIO TO  PATIENT      Beba Driver  11/24/23 5700

## 2024-08-29 ENCOUNTER — TELEPHONE (OUTPATIENT)
Dept: BARIATRICS/WEIGHT MGMT | Age: 24
End: 2024-08-29

## 2024-10-28 NOTE — PROGRESS NOTES
Call Center TCM Note      Flowsheet Row Responses   [facility] patient discharged from? Non-  [[facility]]   Does the patient have one of the following disease processes/diagnoses(primary or secondary)? Pneumonia   TCM attempt successful? Yes   Call start time 1531   Call end time 1534   Discharge diagnosis PNA (pneumonia)/heart failure dx   Medication alerts for this patient Eliquis   Meds reviewed with patient/caregiver? Yes   Does the patient have all medications ordered at discharge? N/A   Is the patient taking all medications as directed (includes completed medication regime)? Yes   Medication comments no medication changes   Comments Hospital f/u 11/5/24@230pm, pt will have iron infusion tomorrow and had infusion while inpt also, Pt will f/u with cardio also, dtr will schedule   Does the patient have an appointment with their PCP within 7-14 days of discharge? Yes   Has home health visited the patient within 72 hours of discharge? N/A   Psychosocial issues? No   Did the patient receive a copy of their discharge instructions? Yes   Nursing interventions Reviewed instructions with patient   What is the patient's perception of their health status since discharge? Same  [Dtr reports that pt is amputee and has pain r/t blister obtained while inpt.  Pt anemic and received iron infusion, dtr wants also to discuss B12.  Encouraged to montior for increased s/s edema.   PCP f/u arrranged.]   Is the patient/caregiver able to teach back signs and symptoms related to disease process for when to call PCP? Yes   TCM call completed? Yes   Call end time 1534            Terese De Leon RN    10/28/2024, 15:40 EDT         Patient scored a 10 on the depression screening tool. Patient states she attends weekly sessions with a counselor jh at [facility]. Patient was also given info on [facility] who specializes in post partum depression.

## (undated) DEVICE — STAPLER SKIN SQ 30 ABSRB STPL DISP INSORB

## (undated) DEVICE — Z DISCONTINUED USE 2744636  DRESSING AQUACEL 14 IN ALG W3.5XL14IN POLYUR FLM CVR W/ HYDRCOLL

## (undated) DEVICE — TUBE BLD COLLECT ST 1 SIL COAT 7ML 10ML

## (undated) DEVICE — SCALPEL SURG NO10 S STL ABS PLAS HNDL DISPOSABLE

## (undated) DEVICE — ZINACTIVE SUPPLIER CLOSED SOLUTION SURG SCRB POVIDONE IOD BDINE

## (undated) DEVICE — GOWN,SIRUS,FABRNF,XL,20/CS: Brand: MEDLINE

## (undated) DEVICE — DRESSING SUPERABSORBENT W4XL4IN 4 LAYR NONWOVEN FLD

## (undated) DEVICE — SUTURE VCRL SZ 0 L36IN ABSRB VLT L36MM CT-1 1/2 CIR J346H

## (undated) DEVICE — SUTURE VCRL SZ 3-0 L27IN ABSRB UD L36MM CT-1 1/2 CIR J258H

## (undated) DEVICE — SUTURE VCRL SZ 2-0 L27IN ABSRB VLT L26MM SH 1/2 CIR J317H

## (undated) DEVICE — CESAREAN BIRTH PACK: Brand: MEDLINE INDUSTRIES, INC.

## (undated) DEVICE — 3M™ STERI-STRIP™ ELASTIC SKIN CLOSURES, E4547, 1/2 IN X 4 IN (12 MM X 100 MM), 6 STRIPS/ENVELOPE: Brand: 3M™ STERI-STRIP™

## (undated) DEVICE — PAD ABD CURITY TENDERSORB 5X9IN

## (undated) DEVICE — ELECTRODE PT RET AD L9FT HI MOIST COND ADH HYDRGEL CORDED

## (undated) DEVICE — SCALPEL SURG NO21 S STL STR W/ INTEGR MTRC RUL DISP

## (undated) DEVICE — LINER,SOFT,SUCTION CANISTER,1500CC: Brand: MEDLINE

## (undated) DEVICE — BLADE CLIPPER GEN PURP NS

## (undated) DEVICE — Z DISCONTINUED USE 2275683 GLOVE SURG SZ 6.5 L12IN FNGR THK13MIL WHT ISOLEX

## (undated) DEVICE — SUTURE VCRL SZ 0 L27IN ABSRB VLT L48MM CTX 1/2 CIR TAPR PNT J364H

## (undated) DEVICE — SLEEVE COMPRESS STD CALF KNEE SCD

## (undated) DEVICE — MASTISOL ADHESIVE LIQ 2/3ML

## (undated) DEVICE — SUTURE VCRL SZ 2-0 L27IN ABSRB VLT L36MM CT-1 1/2 CIR J339H

## (undated) DEVICE — Z DISCONTINUED GLOVE SURG SZ 7 L12IN FNGR THK13MIL WHT ISOLEX POLYISOPRENE

## (undated) DEVICE — Z DISCONTINUED GLOVE SURG SZ 7.5 L12IN FNGR THK13MIL WHT ISOLEX